# Patient Record
Sex: FEMALE | Race: ASIAN | URBAN - METROPOLITAN AREA
[De-identification: names, ages, dates, MRNs, and addresses within clinical notes are randomized per-mention and may not be internally consistent; named-entity substitution may affect disease eponyms.]

---

## 2014-07-02 LAB
ALT SERPL-CCNC: 19 U/L (ref 5–52)
AST SERPL-CCNC: 21 U/L (ref 5–39)
CHOLEST SERPL-MCNC: 170 MG/DL
CREAT SERPL-MCNC: 0.79 MG/DL (ref 0.4–1.4)
GFR SERPL CREATININE-BSD FRML MDRD: 85 ML/MIN/1.73M2
GLUCOSE SERPL-MCNC: 77 MG/DL (ref 65–125)
HDLC SERPL-MCNC: 91 MG/DL
LDLC SERPL CALC-MCNC: 72 MG/DL
POTASSIUM SERPL-SCNC: 5 MMOL/L (ref 3.5–5.3)
TRIGL SERPL-MCNC: 35 MG/DL
TSH SERPL-ACNC: 0.96 UIU/ML (ref 0.27–4.2)

## 2015-07-29 LAB
ALT SERPL-CCNC: 10 U/L (ref 5–52)
AST SERPL-CCNC: 21 U/L (ref 5–39)
CHOLEST SERPL-MCNC: 160 MG/DL
CREAT SERPL-MCNC: 0.62 MG/DL (ref 0.4–1.4)
GFR SERPL CREATININE-BSD FRML MDRD: 112 ML/MIN/1.73M2
GLUCOSE SERPL-MCNC: 82 MG/DL (ref 65–125)
HDLC SERPL-MCNC: 80 MG/DL
LDLC SERPL CALC-MCNC: 73 MG/DL
POTASSIUM SERPL-SCNC: 4.8 MEQ/L (ref 3.5–5.3)
TRIGL SERPL-MCNC: 33 MG/DL
TSH SERPL-ACNC: 1.42 UIU/ML (ref 0.27–4.2)

## 2016-06-20 LAB
ALT SERPL-CCNC: 14 U/L (ref 8–47)
AST SERPL-CCNC: 19 U/L (ref 5–38)
CHOLEST SERPL-MCNC: 191 MG/DL
CREAT SERPL-MCNC: 0.55 MG/DL (ref 0.4–1.4)
GFR SERPL CREATININE-BSD FRML MDRD: 124 ML/MIN/1.73M2
GLUCOSE SERPL-MCNC: 72 MG/DL (ref 65–125)
HBA1C MFR BLD: 5.4 % (ref 0–5.7)
HDLC SERPL-MCNC: 91 MG/DL
HEP C HIM: NORMAL
LDLC SERPL CALC-MCNC: 94 MG/DL
POTASSIUM SERPL-SCNC: 3.8 MEQ/L (ref 3.5–5.3)
TRIGL SERPL-MCNC: 32 MG/DL
TSH SERPL-ACNC: 1.21 UIU/ML (ref 0.27–4.2)

## 2016-10-20 LAB
ALT SERPL-CCNC: 15 U/L
AST SERPL-CCNC: 19 U/L
CREAT SERPL-MCNC: 0.54 MG/DL (ref 0.4–1.4)
GFR SERPL CREATININE-BSD FRML MDRD: 125 ML/MIN/1.73M2
GLUCOSE SERPL-MCNC: 78 MG/DL (ref 70–100)
POTASSIUM SERPL-SCNC: 4.4 MMOL/L (ref 3.5–5.3)

## 2017-04-11 ENCOUNTER — TRANSFERRED RECORDS (OUTPATIENT)
Dept: HEALTH INFORMATION MANAGEMENT | Facility: CLINIC | Age: 32
End: 2017-04-11

## 2017-04-13 ENCOUNTER — TRANSFERRED RECORDS (OUTPATIENT)
Dept: HEALTH INFORMATION MANAGEMENT | Facility: CLINIC | Age: 32
End: 2017-04-13

## 2017-06-20 LAB
AST SERPL-CCNC: 19 U/L
CHOLEST SERPL-MCNC: 198 MG/DL (ref 100–199)
GFR SERPL CREATININE-BSD FRML MDRD: 118 ML/MIN/1.73M2
GLUCOSE SERPL-MCNC: 80 MG/DL (ref 65–125)
HBA1C MFR BLD: 5.5 % (ref 0–5.7)
POTASSIUM SERPL-SCNC: 4.1 MMOL/L (ref 3.5–5.4)
TRIGL SERPL-MCNC: 41 MG/DL (ref 20–149)

## 2017-07-29 ENCOUNTER — TRANSFERRED RECORDS (OUTPATIENT)
Dept: HEALTH INFORMATION MANAGEMENT | Facility: CLINIC | Age: 32
End: 2017-07-29

## 2017-07-29 LAB — EJECTION FRACTION: 65

## 2017-11-10 LAB
ALT SERPL-CCNC: 13 U/L
AST SERPL-CCNC: 16 U/L
CREAT SERPL-MCNC: 0.61 MG/DL (ref 0.4–1.4)
GFR SERPL CREATININE-BSD FRML MDRD: 110 ML/MIN/1.73M2
GLUCOSE SERPL-MCNC: 81 MG/DL (ref 65–125)
POTASSIUM SERPL-SCNC: 4.7 MMOL/L (ref 3.5–5.4)

## 2018-04-03 ENCOUNTER — TRANSFERRED RECORDS (OUTPATIENT)
Dept: HEALTH INFORMATION MANAGEMENT | Facility: CLINIC | Age: 33
End: 2018-04-03

## 2018-04-03 LAB
ALT SERPL-CCNC: 10 U/L
AST SERPL-CCNC: 19 U/L
CHOLEST SERPL-MCNC: 184 MG/DL (ref 100–199)
CREAT SERPL-MCNC: 0.63 MG/DL (ref 0.4–1.4)
GFR SERPL CREATININE-BSD FRML MDRD: 118 ML/MIN/1.73M2
GLUCOSE SERPL-MCNC: 84 MG/DL (ref 65–125)
HBA1C MFR BLD: 5.2 % (ref 0–5.7)
HDLC SERPL-MCNC: 99 MG/DL
LDLC SERPL CALC-MCNC: 77 MG/DL
POTASSIUM SERPL-SCNC: 4.7 MMOL/L (ref 3.5–5.4)
TRIGL SERPL-MCNC: 39 MG/DL (ref 20–149)
TSH SERPL-ACNC: 1.34 UIU/ML (ref 0.27–4.2)

## 2018-06-07 ENCOUNTER — NURSE TRIAGE (OUTPATIENT)
Dept: NURSING | Facility: CLINIC | Age: 33
End: 2018-06-07

## 2018-06-26 ENCOUNTER — OFFICE VISIT (OUTPATIENT)
Dept: FAMILY MEDICINE | Facility: CLINIC | Age: 33
End: 2018-06-26
Payer: COMMERCIAL

## 2018-06-26 VITALS
DIASTOLIC BLOOD PRESSURE: 59 MMHG | HEIGHT: 57 IN | HEART RATE: 63 BPM | RESPIRATION RATE: 16 BRPM | SYSTOLIC BLOOD PRESSURE: 94 MMHG | BODY MASS INDEX: 18.2 KG/M2 | OXYGEN SATURATION: 99 % | WEIGHT: 84.38 LBS | TEMPERATURE: 97.9 F

## 2018-06-26 DIAGNOSIS — G47.00 INSOMNIA, UNSPECIFIED TYPE: Primary | ICD-10-CM

## 2018-06-26 PROBLEM — I34.1 MVP (MITRAL VALVE PROLAPSE): Status: ACTIVE | Noted: 2018-06-26

## 2018-06-26 PROBLEM — K52.832 LYMPHOCYTIC COLITIS: Status: ACTIVE | Noted: 2018-06-26

## 2018-06-26 PROBLEM — R00.2 PALPITATIONS: Status: ACTIVE | Noted: 2018-06-26

## 2018-06-26 PROCEDURE — 99203 OFFICE O/P NEW LOW 30 MIN: CPT | Performed by: NURSE PRACTITIONER

## 2018-06-26 RX ORDER — CLONAZEPAM 1 MG/1
TABLET ORAL
Qty: 30 TABLET | Refills: 5 | Status: SHIPPED | OUTPATIENT
Start: 2018-06-26 | End: 2018-09-25

## 2018-06-26 RX ORDER — CLONAZEPAM 1 MG/1
TABLET ORAL
COMMUNITY
Start: 2018-04-03 | End: 2018-06-26

## 2018-06-26 NOTE — PROGRESS NOTES
"  SUBJECTIVE:   Selina Pappas is a 33 year old female who presents to clinic today for the following health issues:    Was seeing Dr. Livier Wade   [facility] [address] [place] [address]    Takes Klonopin for sleep, is out of Rx  When she lived in [place] she was taking 1-3 times a week before work shifts. Now taking less than that.     She has a history of initial insomnia for many years.  She has been seen at a sleep clinic, tried sleep therapy.  Takes Klonopin on nights before she has to work.  Can sometimes go a couple of weeks without needing to take it, otherwise up to 3 times a week.            Problem list and histories reviewed & adjusted, as indicated.  Additional history: as documented        Reviewed and updated as needed this visit by clinical staff  Meds       Reviewed and updated as needed this visit by Provider         ROS:  CONSTITUTIONAL: NEGATIVE for fever, chills, change in weight  ENT/MOUTH: NEGATIVE for ear, mouth and throat problems  RESP: NEGATIVE for significant cough or SOB  CV: NEGATIVE for chest pain, palpitations or peripheral edema  GI: NEGATIVE for nausea, abdominal pain, heartburn, or change in bowel habits  MUSCULOSKELETAL: NEGATIVE for significant arthralgias or myalgia  NEURO: NEGATIVE for weakness, dizziness or paresthesias  PSYCHIATRIC: NEGATIVE for changes in mood or affect    OBJECTIVE:     BP 94/59  Pulse 63  Temp 97.9  F (36.6  C) (Oral)  Resp 16  Ht 4' 9\" (1.448 m)  Wt 84 lb 6 oz (38.3 kg)  LMP 06/01/2018 (Approximate)  SpO2 99%  Breastfeeding? No  BMI 18.26 kg/m2  Body mass index is 18.26 kg/(m^2).  GENERAL: healthy, alert and no distress  RESP: lungs clear to auscultation - no rales, rhonchi or wheezes  CV: regular rates and rhythm, normal S1 S2, no S3 or S4    PSYCH: mentation appears normal, affect normal/bright        ASSESSMENT/PLAN:             1. Insomnia, unspecified type  Appropriate use of Klonopin.  Refills given.    Consider " trial of Trazodone in future if desired.  She will follow up for an annual exam.   - clonazePAM (KLONOPIN) 1 MG tablet; Take one tablet nightly as needed for insomnia  Dispense: 30 tablet; Refill: 5        Selina Armando NP  [facility]

## 2018-06-26 NOTE — MR AVS SNAPSHOT
"              After Visit Summary   6/26/2018    Selina Pappas    MRN: [id number]           Patient Information     Date Of Birth          1985        Visit Information        Provider Department      6/26/2018 3:30 PM Selina Armando NP [facility]        Today's Diagnoses     Insomnia, unspecified type    -  1       Follow-ups after your visit        Who to contact     If you have questions or need follow up information about today's clinic visit or your schedule please contact [facility] directly at [phone].  Normal or non-critical lab and imaging results will be communicated to you by [facility], letter or phone within 4 business days after the clinic has received the results. If you do not hear from us within 7 days, please contact the clinic through [employer] or phone. If you have a critical or abnormal lab result, we will notify you by phone as soon as possible.  Submit refill requests through [employer] or call your pharmacy and they will forward the refill request to us. Please allow 3 business days for your refill to be completed.          Additional Information About Your Visit        MyChart Information     [employer] gives you secure access to your electronic health record. If you see a primary care provider, you can also send messages to your care team and make appointments. If you have questions, please call your primary care clinic.  If you do not have a primary care provider, please call [phone] and they will assist you.        Care EveryWhere ID     This is your Care EveryWhere ID. This could be used by other organizations to access your [facility] medical records  [id number]        Your Vitals Were     Pulse Temperature Respirations Height Last Period Pulse Oximetry    63 97.9  F (36.6  C) (Oral) 16 4' 9\" (1.448 m) 06/01/2018 (Approximate) 99%    Breastfeeding? BMI (Body Mass Index)                No 18.26 kg/m2           Blood Pressure from Last 3 " Encounters:   06/26/18 94/59    Weight from Last 3 Encounters:   06/26/18 84 lb 6 oz (38.3 kg)              Today, you had the following     No orders found for display         Today's Medication Changes          These changes are accurate as of 6/26/18 10:18 PM.  If you have any questions, ask your nurse or doctor.               These medicines have changed or have updated prescriptions.        Dose/Directions    clonazePAM 1 MG tablet   Commonly known as:  klonoPIN   This may have changed:    - when to take this  - reasons to take this  - additional instructions   Used for:  Insomnia, unspecified type   Changed by:  Selina Armando, NP        Take one tablet nightly as needed for insomnia   Quantity:  30 tablet   Refills:  5            Where to get your medicines      Some of these will need a paper prescription and others can be bought over the counter.  Ask your nurse if you have questions.     Bring a paper prescription for each of these medications     clonazePAM 1 MG tablet                Primary Care Provider Fax #    Physician No Ref-Primary [phone]       No address on file        Equal Access to Services     [facility] : Lindsey Whitehead, waaxda lujaquelin, qaybta kaalmada adeaugusto, mike nguyen . So [facility] [phone].    ATENCIÓN: Si habla español, tiene a rodrigues disposición servicios gratuitos de asistencia lingüística. Llame al [phone].    We comply with applicable federal civil rights laws and [place] laws. We do not discriminate on the basis of race, color, national origin, age, disability, sex, sexual orientation, or gender identity.            Thank you!     Thank you for choosing [facility]  for your care. Our goal is always to provide you with excellent care. Hearing back from our patients is one way we can continue to improve our services. Please take a few minutes to complete the written survey that you may receive in the  mail after your visit with us. Thank you!             Your Updated Medication List - Protect others around you: Learn how to safely use, store and throw away your medicines at www.disposemymeds.org.          This list is accurate as of 6/26/18 10:18 PM.  Always use your most recent med list.                   Brand Name Dispense Instructions for use Diagnosis    clonazePAM 1 MG tablet    klonoPIN    30 tablet    Take one tablet nightly as needed for insomnia    Insomnia, unspecified type

## 2018-06-27 ENCOUNTER — HEALTH MAINTENANCE LETTER (OUTPATIENT)
Age: 33
End: 2018-06-27

## 2018-06-29 ENCOUNTER — DOCUMENTATION ONLY (OUTPATIENT)
Dept: FAMILY MEDICINE | Facility: CLINIC | Age: 33
End: 2018-06-29

## 2018-07-16 LAB — INR PPP: 3.64 (ref 0.9–1.1)

## 2018-08-21 ENCOUNTER — OFFICE VISIT (OUTPATIENT)
Dept: OBGYN | Facility: CLINIC | Age: 33
End: 2018-08-21
Attending: NURSE PRACTITIONER
Payer: COMMERCIAL

## 2018-08-21 VITALS
SYSTOLIC BLOOD PRESSURE: 100 MMHG | DIASTOLIC BLOOD PRESSURE: 64 MMHG | BODY MASS INDEX: 18.83 KG/M2 | HEART RATE: 60 BPM | WEIGHT: 87 LBS

## 2018-08-21 DIAGNOSIS — R10.2 LEFT ADNEXAL TENDERNESS: ICD-10-CM

## 2018-08-21 DIAGNOSIS — R10.2 PELVIC PAIN IN FEMALE: ICD-10-CM

## 2018-08-21 DIAGNOSIS — Z12.4 SCREENING FOR MALIGNANT NEOPLASM OF CERVIX: ICD-10-CM

## 2018-08-21 DIAGNOSIS — Z11.3 SCREENING EXAMINATION FOR VENEREAL DISEASE: ICD-10-CM

## 2018-08-21 DIAGNOSIS — Z00.00 VISIT FOR PREVENTIVE HEALTH EXAMINATION: Primary | ICD-10-CM

## 2018-08-21 DIAGNOSIS — Z01.419 ENCOUNTER FOR GYNECOLOGICAL EXAMINATION WITHOUT ABNORMAL FINDING: ICD-10-CM

## 2018-08-21 PROCEDURE — 87591 N.GONORRHOEAE DNA AMP PROB: CPT | Performed by: NURSE PRACTITIONER

## 2018-08-21 PROCEDURE — G0463 HOSPITAL OUTPT CLINIC VISIT: HCPCS | Mod: ZF

## 2018-08-21 PROCEDURE — 87491 CHLMYD TRACH DNA AMP PROBE: CPT | Performed by: NURSE PRACTITIONER

## 2018-08-21 PROCEDURE — G0145 SCR C/V CYTO,THINLAYER,RESCR: HCPCS | Performed by: NURSE PRACTITIONER

## 2018-08-21 PROCEDURE — 87624 HPV HI-RISK TYP POOLED RSLT: CPT | Performed by: NURSE PRACTITIONER

## 2018-08-21 PROCEDURE — 87210 SMEAR WET MOUNT SALINE/INK: CPT | Mod: ZF | Performed by: NURSE PRACTITIONER

## 2018-08-21 ASSESSMENT — PAIN SCALES - GENERAL: PAINLEVEL: NO PAIN (0)

## 2018-08-21 NOTE — LETTER
Date:August 31, 2018      Patient was self referred, no letter generated. Do not send.        [facility] Health Information

## 2018-08-21 NOTE — PATIENT INSTRUCTIONS
Pap smear and STD testing were completed today.  Schedule pelvic ultrasound at the  before you leave.  There were no signs of a vaginal infection today.   Start taking a prenatal vitamin daily     You will be called with results of Ultrasound and further plan for care determined at that time.       PREVENTIVE HEALTH RECOMMENDATIONS:   Most women need a yearly breast and pelvic exam.    A PAP screen, a test done DURING a pelvic exam, is NO longer recommended yearly.    March 2013, screening guidelines recommended by ACOG for PAP screen are:    1) First pap at age 21.    2) Pap every 3 years until age 30.    3) After age 30, pap every 3 years or Pap with HR HPV screen every 5 years until age 65.  4) Women do NOT need a vaginal Pap screen after a hysterectomy (surgical removal of the uterus) when they have not had cancer.    Exceptions:  1) Yearly pap if HIV+ or immunosuppressed secondary to organ transplant  2) MARIE II-III continue routine screening for 20 years.    I encourage you continue looking for opportunities to choose a healthy lifestyle:       * Choose to eat a heart healthy diet. Check out the FOOD PLATE guidelines at: http://www.choosemyplate.gov/ for helpful hints on weight and cholesterol management.  Balance your caloric intake with exercise to maintain a BMI in the 22 to 26 range. For bone health: Eat calcium-rich foods like yogurt, broccoli or take chewable calcium pills (500 to 600 mg) twice a day with food.       * Exercise for at least an average of 30 minutes a day, 5 days of the week. This will help you control your weight, release stress, and help prevent disease.      * Take a Vitamin D3 supplement daily fall through spring and during summer unless you [id number]' full body sun exposure to skin without sunscreen.      * DO wear sunscreen to prevent skin cancer after the first 15-30 minutes.      * Identify stressors in your life, find ways to release the stress, and, make time for  yourself. PLEASE ask for help if mood changes last longer than two weeks.     * Limit alcohol to one drink per day.  No smoking.  Avoid second hand smoke. If you smoke, ask for help to stop.       *  If you are in a sexual relationship, talk with your partner about possible infection risks and take action to protect yourself from exposure to a sexual infection.    Please request an infection screen for STIs (sexually transmitted infections) if you are less than age 26 OR believe that you may be at risk.     Get a flu shot each year. Get a tetanus shot every 10 years. EVERYONE needs a pertussis (Whooping cough) booster.    See your dentist twice a year for an exam and preventive care cleaning.     Consider the following screen tests:    1) cholesterol test every 5 years.     2) yearly mammogram after age 40 unless you have identified risks.    3) colonoscopy every 10 years after age 50 unless you have identified risks.    4) diabetes blood test screening if you are at risk for diabetes.      Additional information that you may also find helpful:  The Internet now gives us access to LOTS of information -- some of it helpful, research documented and also plenty of harmful, anecdotal information that may not pertain to your situtaion. Consider visiting the following websites for accurate health information:    www.vitamindcouncil.org/ : Info and ongoing research re Vitamin D    www.fairview.org : Up to date and easily searchable information on multiple topics.    www.medlineplus.gov : medication info, interactive tutorials, watch real surgeries online    www.cdc.gov : public health info, travel advisories, epidemics (H1N1)    www.mendel/std.org: current research re diagnosis, treatment and prevention of sexually contacted infections.    www.health.[facility].[place] : [place] dept of heatlh, public health issues in [place], N1N1    www.familydoctor.org : good info from the Academy of Family Physicians

## 2018-08-21 NOTE — MR AVS SNAPSHOT
After Visit Summary   8/21/2018    Selina Pappas    MRN: [id number]           Patient Information     Date Of Birth          1985        Visit Information        Provider Department      8/21/2018 2:30 PM Lila Law APRN [facility]        Today's Diagnoses     Left adnexal tenderness    -  1    Visit for preventive health examination        Screening examination for venereal disease        Screening for malignant neoplasm of cervix        Encounter for gynecological examination without abnormal finding        Pelvic pain in female          Care Instructions    Pap smear and STD testing were completed today.  Schedule pelvic ultrasound at the  before you leave.  There were no signs of a vaginal infection today.     You will be called with results of Ultrasound and further plan for care determined at that time.       PREVENTIVE HEALTH RECOMMENDATIONS:   Most women need a yearly breast and pelvic exam.    A PAP screen, a test done DURING a pelvic exam, is NO longer recommended yearly.    March 2013, screening guidelines recommended by ACOG for PAP screen are:    1) First pap at age 21.    2) Pap every 3 years until age 30.    3) After age 30, pap every 3 years or Pap with HR HPV screen every 5 years until age 65.  4) Women do NOT need a vaginal Pap screen after a hysterectomy (surgical removal of the uterus) when they have not had cancer.    Exceptions:  1) Yearly pap if HIV+ or immunosuppressed secondary to organ transplant  2) MARIE II-III continue routine screening for 20 years.    I encourage you continue looking for opportunities to choose a healthy lifestyle:       * Choose to eat a heart healthy diet. Check out the FOOD PLATE guidelines at: http://www.choosemyplate.gov/ for helpful hints on weight and cholesterol management.  Balance your caloric intake with exercise to maintain a BMI in the 22 to 26 range. For bone health: Eat calcium-rich  foods like yogurt, broccoli or take chewable calcium pills (500 to 600 mg) twice a day with food.       * Exercise for at least an average of 30 minutes a day, 5 days of the week. This will help you control your weight, release stress, and help prevent disease.      * Take a Vitamin D3 supplement daily fall through spring and during summer unless you [id number]' full body sun exposure to skin without sunscreen.      * DO wear sunscreen to prevent skin cancer after the first 15-30 minutes.      * Identify stressors in your life, find ways to release the stress, and, make time for yourself. PLEASE ask for help if mood changes last longer than two weeks.     * Limit alcohol to one drink per day.  No smoking.  Avoid second hand smoke. If you smoke, ask for help to stop.       *  If you are in a sexual relationship, talk with your partner about possible infection risks and take action to protect yourself from exposure to a sexual infection.    Please request an infection screen for STIs (sexually transmitted infections) if you are less than age 26 OR believe that you may be at risk.     Get a flu shot each year. Get a tetanus shot every 10 years. EVERYONE needs a pertussis (Whooping cough) booster.    See your dentist twice a year for an exam and preventive care cleaning.     Consider the following screen tests:    1) cholesterol test every 5 years.     2) yearly mammogram after age 40 unless you have identified risks.    3) colonoscopy every 10 years after age 50 unless you have identified risks.    4) diabetes blood test screening if you are at risk for diabetes.      Additional information that you may also find helpful:  The Internet now gives us access to LOTS of information -- some of it helpful, research documented and also plenty of harmful, anecdotal information that may not pertain to your situtaion. Consider visiting the following websites for accurate health information:    www.vitamindcouncil.org/ : Info and  ongoing research re Vitamin D    www.fairview.org : Up to date and easily searchable information on multiple topics.    www.medlineplus.gov : medication info, interactive tutorials, watch real surgeries online    www.cdc.gov : public health info, travel advisories, epidemics (H1N1)    www.mendel/std.org: current research re diagnosis, treatment and prevention of sexually contacted infections.    www.health.[facility].[place] : [place] dept [facility], public health issues in [place], N1N1    www.familydoctor.org : good info from the Academy of Family Physicians                Follow-ups after your visit        Follow-up notes from your care team     Return in 1 year (on 8/21/2019) for Preventative Health Visit.      Future tests that were ordered for you today     Open Future Orders        Priority Expected Expires Ordered    US Pelvic Complete with Transvaginal Routine  8/21/2019 8/21/2018            Who to contact     Please call your clinic at [phone] to:    Ask questions about your health    Make or cancel appointments    Discuss your medicines    Learn about your test results    Speak to your doctor            Additional Information About Your Visit        [employer] Information     [employer] gives you secure access to your electronic health record. If you see a primary care provider, you can also send messages to your care team and make appointments. If you have questions, please call your primary care clinic.  If you do not have a primary care provider, please call [phone] and they will assist you.      [employer] is an electronic gateway that provides easy, online access to your medical records. With [employer], you can request a clinic appointment, read your test results, renew a prescription or communicate with your care team.     To access your existing account, please contact your [facility] or call [phone] for assistance.        Care EveryWhere ID     This is your Care EveryWhere ID. This  could be used by other organizations to access your [facility] medical records  [id number]        Your Vitals Were     Pulse Last Period Breastfeeding? BMI (Body Mass Index)          60 07/07/2018 No 18.83 kg/m2         Blood Pressure from Last 3 Encounters:   08/21/18 100/64   06/26/18 94/59    Weight from Last 3 Encounters:   08/21/18 39.5 kg (87 lb)   06/26/18 38.3 kg (84 lb 6 oz)              We Performed the Following     Chlamydia trachomatis PCR Swab      HPV High Risk Types DNA Cervical     Neisseria gonorrhoeae PCR Swab [[id number]]     Pap imaged thin layer screen with HPV - recommended age 30 - 65 years (select HPV order below)     Pap Smear Exam [] Do Not Remove     Pelvic and Breast Exam Procedure []     Wet Prep POCT        Primary Care Provider Fax #    Physician No Ref-Primary [phone]       No address on file        Equal Access to Services     [facility] : Lindsey Whitehead, americo millan, aniya pattersonalmajoel redmond, mike nguyen . So [facility] [place] [phone].    ATENCIÓN: Si habla español, tiene a rodrigues disposición servicios gratuitos de asistencia lingüística. Llame al [phone].    We comply with applicable federal civil rights laws and [place] laws. We do not discriminate on the basis of race, color, national origin, age, disability, sex, sexual orientation, or gender identity.            Thank you!     Thank you for choosing [facility]  for your care. Our goal is always to provide you with excellent care. Hearing back from our patients is one way we can continue to improve our services. Please take a few minutes to complete the written survey that you may receive in the mail after your visit with us. Thank you!             Your Updated Medication List - Protect others around you: Learn how to safely use, store and throw away your medicines at www.disposemymeds.org.          This list is accurate as of 8/21/18  3:27 PM.   Always use your most recent med list.                   Brand Name Dispense Instructions for use Diagnosis    clonazePAM 1 MG tablet    klonoPIN    30 tablet    Take one tablet nightly as needed for insomnia    Insomnia, unspecified type

## 2018-08-21 NOTE — LETTER
"2018       RE: Selina Pappas  [address]  [facility] [address]     Dear Colleague,    Thank you for referring your patient, Selina Pappas, to the [facility] at [facility]. Please see a copy of my visit note below.      Progress Note    SUBJECTIVE:  Selina Pappas is an 33 year old, , who requests an Annual Preventive Exam.   PCP: Selina keane CNP    Concerns today include:     1. Right sided sharp pelvic/ lower quadrant abdominal pain: Started about 1 month ago. Notes it \"randomly\" throughout the days. Does report mild bloating.  Selina has a hx of lymphocytic colitis after a colonoscopy to evaluate bloating and diarrhea in 2017. She recently saw gastroenterology who does not think the pain is GI related.  Her pain does not seem to be correlated with meals. Denies fever, nausea, urinary symptoms, constipation, or diarrhea.  She has been with the same sexual partner for 5 yrs. Denies concern for STD, but open to testing today. Denies change in vaginal discharge, malodor, or vaginal itching.     Contraception: pull out method; considering trying to conceive in the coming months.     LMP: 2018  - Menses: q34 days with 2 days of moderate and then 2 days of light bleeding  - Does have significant dysmenorrhea; does not typically take any medication to relieve this. Not able to take NSAIDS due to her history of colitis.     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    *Did complete HPV vaccines at age 26-27.    Mammogram current: n/a - paternal grandmother as only family meber with breast cancer    HISTORY:    Current Outpatient Prescriptions on File Prior to Visit:  clonazePAM (KLONOPIN) 1 MG tablet Take one tablet nightly as needed for insomnia     No current facility-administered " medications on file prior to visit.   No Known Allergies  Immunization History   Administered Date(s) Administered     TDAP Vaccine (Adacel) 2017       Obstetric History       T0      L0     SAB1   TAB0   Ectopic0   Multiple0   Live Births0      Past Medical History:   Diagnosis Date     Abnormal Pap smear of cervix 2005     Lymphocytic colitis      Mitral valve prolapse      Past Surgical History:   Procedure Laterality Date     LEEP [place], CERVICAL  2004     Family History   Problem Relation Age of Onset     Hyperlipidemia Mother      Hyperlipidemia Father      Hypertension Father      Osteoporosis Maternal Grandmother      Lung Cancer Maternal Grandfather      Breast Cancer Paternal Grandmother      Alcoholism Paternal Grandfather      Social History     Social History     Marital status:      Spouse name: N/A     Number of children: N/A     Years of education: N/A     Social History Main Topics     Smoking status: Never Smoker     Smokeless tobacco: Never Used     Alcohol use Yes      Comment: maybe 2-4 times per month      Drug use: No     Sexual activity: Yes     Partners: Male     Birth control/ protection: None, Pull-out method     ROS  ROS: 10 point ROS neg other than the symptoms noted above in the HPI.    No flowsheet data found.  No flowsheet data found.      EXAM:  Blood pressure 100/64, pulse 60, weight 39.5 kg (87 lb), last menstrual period 2018, not currently breastfeeding. Body mass index is 18.83 kg/(m^2).  General - pleasant female in no acute distress.  Skin - no suspicious lesions or rashes  EENT-  PERRLA, euthyroid with out palpable nodules  Neck - supple without lymphadenopathy.  Lungs - clear to auscultation bilaterally.  Heart - regular rate and rhythm without murmur.  Abdomen - soft, nontender, nondistended, no masses or organomegaly noted.  Musculoskeletal - no gross deformities.  Neurological - normal strength, sensation, and mental status.    Breast  Exam:  Breast: Without visible skin changes. No dimpling or lesions seen.   Breasts supple, non-tender with palpation, no dominant mass, nodularity, or nipple discharge noted bilaterally. Axillary nodes negative.      Pelvic Exam:  EG/BUS: Normal genital architecture without lesions, erythema or abnormal secretions; Bartholin's, Urethra, [place]'s normal   Urethral meatus: normal   Urethra: no masses, tenderness, or scarring   Bladder: no masses or tenderness   Vagina: moist, pink, rugae with creamy, white and odorless secretions  Cervix: pink, moist, closed, without lesion or CMT  Uterus: small, smooth, firm, mobile w/o pain  Adnexa: Within normal limits and No masses, nodularity, mild right adnexal tenderness  Rectum: anus normal     Wet prep: pH=3.6; negative for clue cells, yeast, and trich  UPT: negative    ASSESSMENT:  Encounter Diagnoses   Name Primary?     Visit for preventive health examination Yes     Screening examination for venereal disease      Screening for malignant neoplasm of cervix      Encounter for gynecological examination without abnormal finding      Left adnexal tenderness      Pelvic pain in female         PLAN:   Orders Placed This Encounter   Procedures     Pelvic and Breast Exam Procedure []     Pap Smear Exam [] Do Not Remove     US Pelvic Complete with Transvaginal     Pap imaged thin layer screen with HPV - recommended age 30 - 65 years (select HPV order below)     HPV High Risk Types DNA Cervical     Wet Prep POCT     hCG qual urine POCT     Pap with HPV testing done today.  Gonorrhea & chlamydia tests done today  No signs of a vaginal infection.   Patient to schedule pelvic ultrasound to evaluate for cause of right adnexal pain. Patient will be called with the results of the ultrasound and further plan for care will be determined at that time.  May manage pelvic pain with application of heat and Tylenol as needed.  Recommend starting a prenatal vitamin daily.    Additional  teaching done at this visit regarding calcium (1200 mg per day), self breast exam, exercise, preconception, mental health and weight/diet.  Return to clinic in one year for next preventative health exam.  Follow-up as needed.    Lila Law DNP, APRN, WHNP                                  Again, thank you for allowing me to participate in the care of your patient.      Sincerely,    ANTONIO Hutchins CNP

## 2018-08-21 NOTE — PROGRESS NOTES
"  Progress Note    SUBJECTIVE:  Selina Pappas is an 33 year old, , who requests an Annual Preventive Exam.   PCP: Selina keane CNP    Concerns today include:     1. Right sided sharp pelvic/ lower quadrant abdominal pain: Started about 1 month ago. Notes it \"randomly\" throughout the days. Does report mild bloating.  Selina has a hx of lymphocytic colitis after a colonoscopy to evaluate bloating and diarrhea in 2017. She recently saw gastroenterology who does not think the pain is GI related.  Her pain does not seem to be correlated with meals. Denies fever, nausea, urinary symptoms, constipation, or diarrhea.  She has been with the same sexual partner for 5 yrs. Denies concern for STD, but open to testing today. Denies change in vaginal discharge, malodor, or vaginal itching.     Contraception: pull out method; considering trying to conceive in the coming months.     LMP: 2018  - Menses: q34 days with 2 days of moderate and then 2 days of light bleeding  - Does have significant dysmenorrhea; does not typically take any medication to relieve this. Not able to take NSAIDS due to her history of colitis.     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    *Did complete HPV vaccines at age 26-27.    Mammogram current: n/a - paternal grandmother as only family meber with breast cancer    HISTORY:    Current Outpatient Prescriptions on File Prior to Visit:  clonazePAM (KLONOPIN) 1 MG tablet Take one tablet nightly as needed for insomnia     No current facility-administered medications on file prior to visit.   No Known Allergies  Immunization History   Administered Date(s) Administered     TDAP Vaccine (Adacel) 2017       Obstetric History       T0      L0     SAB1   TAB0   Ectopic0   Multiple0   Live Births0      Past Medical History:   Diagnosis Date "     Abnormal Pap smear of cervix 2005     Lymphocytic colitis      Mitral valve prolapse      Past Surgical History:   Procedure Laterality Date     LEEP [place], CERVICAL  2004     Family History   Problem Relation Age of Onset     Hyperlipidemia Mother      Hyperlipidemia Father      Hypertension Father      Osteoporosis Maternal Grandmother      Lung Cancer Maternal Grandfather      Breast Cancer Paternal Grandmother      Alcoholism Paternal Grandfather      Social History     Social History     Marital status:      Spouse name: N/A     Number of children: N/A     Years of education: N/A     Social History Main Topics     Smoking status: Never Smoker     Smokeless tobacco: Never Used     Alcohol use Yes      Comment: maybe 2-4 times per month      Drug use: No     Sexual activity: Yes     Partners: Male     Birth control/ protection: None, Pull-out method     ROS  ROS: 10 point ROS neg other than the symptoms noted above in the HPI.    No flowsheet data found.  No flowsheet data found.      EXAM:  Blood pressure 100/64, pulse 60, weight 39.5 kg (87 lb), last menstrual period 07/07/2018, not currently breastfeeding. Body mass index is 18.83 kg/(m^2).  General - pleasant female in no acute distress.  Skin - no suspicious lesions or rashes  EENT-  PERRLA, euthyroid with out palpable nodules  Neck - supple without lymphadenopathy.  Lungs - clear to auscultation bilaterally.  Heart - regular rate and rhythm without murmur.  Abdomen - soft, nontender, nondistended, no masses or organomegaly noted.  Musculoskeletal - no gross deformities.  Neurological - normal strength, sensation, and mental status.    Breast Exam:  Breast: Without visible skin changes. No dimpling or lesions seen.   Breasts supple, non-tender with palpation, no dominant mass, nodularity, or nipple discharge noted bilaterally. Axillary nodes negative.      Pelvic Exam:  EG/BUS: Normal genital architecture without lesions, erythema or abnormal  secretions; Bartholin's, Urethra, [place]'s normal   Urethral meatus: normal   Urethra: no masses, tenderness, or scarring   Bladder: no masses or tenderness   Vagina: moist, pink, rugae with creamy, white and odorless secretions  Cervix: pink, moist, closed, without lesion or CMT  Uterus: small, smooth, firm, mobile w/o pain  Adnexa: Within normal limits and No masses, nodularity, mild right adnexal tenderness  Rectum: anus normal     Wet prep: pH=3.6; negative for clue cells, yeast, and trich  UPT: negative    ASSESSMENT:  Encounter Diagnoses   Name Primary?     Visit for preventive health examination Yes     Screening examination for venereal disease      Screening for malignant neoplasm of cervix      Encounter for gynecological examination without abnormal finding      Left adnexal tenderness      Pelvic pain in female         PLAN:   Orders Placed This Encounter   Procedures     Pelvic and Breast Exam Procedure []     Pap Smear Exam [] Do Not Remove     US Pelvic Complete with Transvaginal     Pap imaged thin layer screen with HPV - recommended age 30 - 65 years (select HPV order below)     HPV High Risk Types DNA Cervical     Wet Prep POCT     hCG qual urine POCT     Pap with HPV testing done today.  Gonorrhea & chlamydia tests done today  No signs of a vaginal infection.   Patient to schedule pelvic ultrasound to evaluate for cause of right adnexal pain. Patient will be called with the results of the ultrasound and further plan for care will be determined at that time.  May manage pelvic pain with application of heat and Tylenol as needed.  Recommend starting a prenatal vitamin daily.    Additional teaching done at this visit regarding calcium (1200 mg per day), self breast exam, exercise, preconception, mental health and weight/diet.  Return to clinic in one year for next preventative health exam.  Follow-up as needed.    Lila Law, DNP, APRN, WHNP

## 2018-08-22 LAB
C TRACH DNA SPEC QL NAA+PROBE: NEGATIVE
CLUE CELLS: NORMAL
N GONORRHOEA DNA SPEC QL NAA+PROBE: NEGATIVE
SPECIMEN SOURCE: NORMAL
SPECIMEN SOURCE: NORMAL
TRICHOMONAS (WET PREP): NORMAL
YEAST (WET PREP): NORMAL

## 2018-08-24 LAB
COPATH REPORT: NORMAL
PAP: NORMAL

## 2018-08-28 LAB
FINAL DIAGNOSIS: NORMAL
HPV HR 12 DNA CVX QL NAA+PROBE: NEGATIVE
HPV16 DNA SPEC QL NAA+PROBE: NEGATIVE
HPV18 DNA SPEC QL NAA+PROBE: NEGATIVE
SPECIMEN DESCRIPTION: NORMAL
SPECIMEN SOURCE CVX/VAG CYTO: NORMAL

## 2018-09-04 ENCOUNTER — RADIANT APPOINTMENT (OUTPATIENT)
Dept: ULTRASOUND IMAGING | Facility: CLINIC | Age: 33
End: 2018-09-04
Attending: PHYSICAL MEDICINE & REHABILITATION
Payer: COMMERCIAL

## 2018-09-04 DIAGNOSIS — R10.2 LEFT ADNEXAL TENDERNESS: ICD-10-CM

## 2018-09-04 DIAGNOSIS — R10.2 PELVIC PAIN IN FEMALE: ICD-10-CM

## 2018-09-04 PROCEDURE — 76856 US EXAM PELVIC COMPLETE: CPT

## 2018-09-22 ENCOUNTER — TELEPHONE (OUTPATIENT)
Dept: OBGYN | Facility: CLINIC | Age: 33
End: 2018-09-22

## 2018-09-22 NOTE — TELEPHONE ENCOUNTER
Received call from patient via on call paging service re: 7 weeks pregnant and unable to sleep.     Has tried lifestyle modifications, OTC benadryl, OTC unisom, melatonin, etc and reports unable to sleep more than 1-2 hours for the last several weeks. She has baseline problems sleeping and for the last several years in fact, has gotten at most 4 hours per night. Takes Klonopin prn for sleep, but has not been taking since pregnancy. She became tearful when describing her struggles with sleep deprivation over the last few years.     We discussed I am unable to call her in any controlled substances (ambien, etc) for sleep over the weekend. Would really recommend consultation in clinic to discuss risks/benefits of medications and also likely needs some alternative therapies. She has seen sleep specialists in the past. Also endorses has some anxiety currently and in the past, and also has past history of some depression/suicidal ideation. Denies current depressive thoughts, SI/HI or plans.     I highly recommend coming in to clinic early next week, preferable Monday if there are any openings. We discussed likely would be best to start serotonin specific reuptake inhibitor as these are best studied in pregnancy and may help with longer-term anxiety and sleep as well. Could possibly consider short-term ambien while awaiting effects of SSRIs, but would NOT be giving her any more than 5-10 tabs and would need to find alternatives and better studied therapies for the remainder of her pregnancy. She seemed amenable to seeing psychiatry as well when I mentioned this. Will try to get her worked into clinic early this coming week to address these issues, before her NOB visit scheduled in 2 weeks.     Ana Lilia Mayers MD  9/22/2018  10:08 AM

## 2018-09-25 ENCOUNTER — OFFICE VISIT (OUTPATIENT)
Dept: INTERNAL MEDICINE | Facility: CLINIC | Age: 33
End: 2018-09-25
Attending: INTERNAL MEDICINE
Payer: COMMERCIAL

## 2018-09-25 ENCOUNTER — RADIANT APPOINTMENT (OUTPATIENT)
Dept: ULTRASOUND IMAGING | Facility: CLINIC | Age: 33
End: 2018-09-25
Attending: INTERNAL MEDICINE
Payer: COMMERCIAL

## 2018-09-25 VITALS
DIASTOLIC BLOOD PRESSURE: 60 MMHG | HEIGHT: 57 IN | SYSTOLIC BLOOD PRESSURE: 94 MMHG | HEART RATE: 77 BPM | WEIGHT: 87.4 LBS | BODY MASS INDEX: 18.85 KG/M2

## 2018-09-25 DIAGNOSIS — Z33.1 PREGNANCY, INCIDENTAL: Primary | ICD-10-CM

## 2018-09-25 DIAGNOSIS — Z34.00 SUPERVISION OF NORMAL FIRST PREGNANCY, ANTEPARTUM: ICD-10-CM

## 2018-09-25 DIAGNOSIS — F51.04 PSYCHOPHYSIOLOGICAL INSOMNIA: Primary | ICD-10-CM

## 2018-09-25 DIAGNOSIS — Z33.1 PREGNANCY, INCIDENTAL: ICD-10-CM

## 2018-09-25 LAB
ABO + RH BLD: NORMAL
ABO + RH BLD: NORMAL
BASOPHILS # BLD AUTO: 0 10E9/L (ref 0–0.2)
BASOPHILS NFR BLD AUTO: 0.4 %
BLD GP AB SCN SERPL QL: NORMAL
BLOOD BANK CMNT PATIENT-IMP: NORMAL
DEPRECATED CALCIDIOL+CALCIFEROL SERPL-MC: 33 UG/L (ref 20–75)
DIFFERENTIAL METHOD BLD: NORMAL
EOSINOPHIL # BLD AUTO: 0 10E9/L (ref 0–0.7)
EOSINOPHIL NFR BLD AUTO: 0.6 %
ERYTHROCYTE [DISTWIDTH] IN BLOOD BY AUTOMATED COUNT: 12.8 % (ref 10–15)
HBV SURFACE AB SERPL IA-ACNC: 249.1 M[IU]/ML
HBV SURFACE AG SERPL QL IA: NONREACTIVE
HCT VFR BLD AUTO: 36.5 % (ref 35–47)
HGB BLD-MCNC: 12.4 G/DL (ref 11.7–15.7)
HIV 1+2 AB+HIV1 P24 AG SERPL QL IA: NONREACTIVE
IMM GRANULOCYTES # BLD: 0 10E9/L (ref 0–0.4)
IMM GRANULOCYTES NFR BLD: 0.2 %
LYMPHOCYTES # BLD AUTO: 1.5 10E9/L (ref 0.8–5.3)
LYMPHOCYTES NFR BLD AUTO: 27.1 %
MCH RBC QN AUTO: 32 PG (ref 26.5–33)
MCHC RBC AUTO-ENTMCNC: 34 G/DL (ref 31.5–36.5)
MCV RBC AUTO: 94 FL (ref 78–100)
MONOCYTES # BLD AUTO: 0.4 10E9/L (ref 0–1.3)
MONOCYTES NFR BLD AUTO: 7.4 %
NEUTROPHILS # BLD AUTO: 3.5 10E9/L (ref 1.6–8.3)
NEUTROPHILS NFR BLD AUTO: 64.3 %
NRBC # BLD AUTO: 0 10*3/UL
NRBC BLD AUTO-RTO: 0 /100
PLATELET # BLD AUTO: 295 10E9/L (ref 150–450)
RBC # BLD AUTO: 3.88 10E12/L (ref 3.8–5.2)
SPECIMEN EXP DATE BLD: NORMAL
WBC # BLD AUTO: 5.4 10E9/L (ref 4–11)

## 2018-09-25 PROCEDURE — 87389 HIV-1 AG W/HIV-1&-2 AB AG IA: CPT | Performed by: ADVANCED PRACTICE MIDWIFE

## 2018-09-25 PROCEDURE — 86762 RUBELLA ANTIBODY: CPT | Performed by: ADVANCED PRACTICE MIDWIFE

## 2018-09-25 PROCEDURE — 76817 TRANSVAGINAL US OBSTETRIC: CPT

## 2018-09-25 PROCEDURE — 85025 COMPLETE CBC W/AUTO DIFF WBC: CPT | Performed by: ADVANCED PRACTICE MIDWIFE

## 2018-09-25 PROCEDURE — G0463 HOSPITAL OUTPT CLINIC VISIT: HCPCS

## 2018-09-25 PROCEDURE — 86706 HEP B SURFACE ANTIBODY: CPT | Performed by: ADVANCED PRACTICE MIDWIFE

## 2018-09-25 PROCEDURE — 86850 RBC ANTIBODY SCREEN: CPT | Performed by: ADVANCED PRACTICE MIDWIFE

## 2018-09-25 PROCEDURE — 86780 TREPONEMA PALLIDUM: CPT | Performed by: ADVANCED PRACTICE MIDWIFE

## 2018-09-25 PROCEDURE — 87340 HEPATITIS B SURFACE AG IA: CPT | Performed by: ADVANCED PRACTICE MIDWIFE

## 2018-09-25 PROCEDURE — 86901 BLOOD TYPING SEROLOGIC RH(D): CPT | Performed by: ADVANCED PRACTICE MIDWIFE

## 2018-09-25 PROCEDURE — 82306 VITAMIN D 25 HYDROXY: CPT | Performed by: ADVANCED PRACTICE MIDWIFE

## 2018-09-25 PROCEDURE — 86900 BLOOD TYPING SEROLOGIC ABO: CPT | Performed by: ADVANCED PRACTICE MIDWIFE

## 2018-09-25 PROCEDURE — 36415 COLL VENOUS BLD VENIPUNCTURE: CPT | Performed by: ADVANCED PRACTICE MIDWIFE

## 2018-09-25 RX ORDER — AMITRIPTYLINE HYDROCHLORIDE 10 MG/1
10 TABLET ORAL AT BEDTIME
Qty: 30 TABLET | Refills: 3 | Status: SHIPPED | OUTPATIENT
Start: 2018-09-25 | End: 2018-09-25

## 2018-09-25 RX ORDER — AMITRIPTYLINE HYDROCHLORIDE 10 MG/1
10 TABLET ORAL AT BEDTIME
Qty: 30 TABLET | Refills: 3 | Status: SHIPPED | OUTPATIENT
Start: 2018-09-25

## 2018-09-25 ASSESSMENT — ENCOUNTER SYMPTOMS
POLYDIPSIA: 0
SWOLLEN GLANDS: 0
POLYPHAGIA: 0
HEADACHES: 0
BACK PAIN: 0
INSOMNIA: 1
SINUS CONGESTION: 0
SEIZURES: 0
FATIGUE: 0
JOINT SWELLING: 0
DYSPNEA ON EXERTION: 0
PARALYSIS: 0
COUGH: 0
DIARRHEA: 0
ALTERED TEMPERATURE REGULATION: 0
BRUISES/BLEEDS EASILY: 0
ABDOMINAL PAIN: 0
NERVOUS/ANXIOUS: 1
WEAKNESS: 0
CONSTIPATION: 0
CHILLS: 0
FEVER: 1
DECREASED APPETITE: 0
ARTHRALGIAS: 0

## 2018-09-25 NOTE — LETTER
9/25/2018       RE: Selina Pappas  [address]  [facility] [address]     Dear Colleague,    Thank you for referring your patient, Selina Pappas, to the [facility]  at [facility]. Please see a copy of my visit note below.    HPI  Patient is here for evaluation of insomnia. She reports that she has recently got pregnant. She will be moving to [place] next month. She has recently moved to [place], would have to move again. She states that she has been taking clonopin for sleep. She does not take it every night. She has been prescribed it for 8-9 years. She states that works 12 hour shifts. She has been to sleep clinic, however, has not been seen by Sleep Psychology. She has stopped the medication recently due to pregnancy. She reports that she usually goes to bed around 11-12. She reports that she tries to stay in bed. She does not watch TV in bed.     Review of Systems     Constitutional:  Positive for fever. Negative for chills, fatigue and decreased appetite.   HENT:  Negative for dry mouth and sinus congestion.    Respiratory:   Negative for cough and dyspnea on exertion.    Cardiovascular:  Negative for chest pain, dyspnea on exertion and edema.   Gastrointestinal:  Negative for abdominal pain, diarrhea and constipation.   Musculoskeletal:  Negative for back pain, joint swelling and arthralgias.   Skin:  Negative for itching and rash.   Neurological:  Negative for seizures, weakness, headaches and paralysis.   Endo/Heme:  Negative for anemia, swollen glands and bruises/bleeds easily.   Psychiatric/Behavioral:  Positive for mood swings.    Endocrine:  Negative for altered temperature regulation, polyphagia, polydipsia, unwanted hair growth and change in facial hair.    No current outpatient prescriptions on file.     No current facility-administered medications for this visit.      Past Medical History:   Diagnosis Date     Abnormal Pap  "smear of cervix 2005     Lymphocytic colitis      Mitral valve prolapse      Past Surgical History:   Procedure Laterality Date     LEEP TX, CERVICAL  2004     Social History     Social History     Marital status:      Spouse name: N/A     Number of children: N/A     Years of education: N/A     Occupational History     Not on file.     Social History Main Topics     Smoking status: Never Smoker     Smokeless tobacco: Never Used     Alcohol use Yes      Comment: maybe 2-4 times per month      Drug use: No     Sexual activity: Yes     Partners: Male     Birth control/ protection: None, Pull-out method     Other Topics Concern     Not on file     Social History Narrative       Vitals:    09/25/18 0811 09/25/18 0812 09/25/18 0813   BP: 91/61 94/60 94/60   Pulse: 81 82 77   Weight: 39.6 kg (87 lb 6.4 oz)     Height: 1.448 m (4' 9\")           Physical Exam   Constitutional: She is oriented to person, place, and time and well-developed, well-nourished, and in no distress.   HENT:   Head: Normocephalic and atraumatic.   Eyes: Conjunctivae are normal. Pupils are equal, round, and reactive to light.   Neck: Normal range of motion.   Cardiovascular: Normal rate.    Pulmonary/Chest: Effort normal.   Musculoskeletal: She exhibits no edema.   Neurological: She is alert and oriented to person, place, and time. Gait normal.   Skin: Skin is warm and dry.   Psychiatric: Mood, memory, affect and judgment normal.   Vitals reviewed.      Assessment and Plan:  Selina was seen today for establish care.    Diagnoses and all orders for this visit:    Psychophysiological insomnia. Reviewed management of insomnia with patient. Recommend lifestyle modifications to improve sleep hygiene. Referral to the patient today.  Recommend to sleep psychology to address insomnia.  Patient was also advised on medical management of insomnia.  Discussed amitriptyline and trazodone.  We will start low-dose amitriptyline at bedtime as it has good " safety profile for pregnancy.  Patient was advised on medication related side effects.  She will contact the clinic if her insomnia is not improving.  May consider use of zolpidem if patient has no sufficient relief of her symptoms.    -     SLEEP EVALUATION & MANAGEMENT REFERRAL - ADULT -[facility] [place] / [facility]  [phone] (Age 2 and up); Future  -     amitriptyline (ELAVIL) 10 MG tablet; Take 1 tablet (10 mg) by mouth At Bedtime      Total time spent 45  minutes.  More than 50% of the time spent with Ms. Pappas on counseling / coordinating her care    Yazmin Zaragoza MD            Again, thank you for allowing me to participate in the care of your patient.      Sincerely,    Yazmin Zaragoza MD

## 2018-09-25 NOTE — LETTER
Date:September 26, 2018      Patient was self referred, no letter generated. Do not send.        [facility] Information

## 2018-09-25 NOTE — PROGRESS NOTES
HPI  Patient is here for evaluation of insomnia. She reports that she has recently got pregnant. She will be moving to [place] next month. She has recently moved to [place], would have to move again. She states that she has been taking clonopin for sleep. She does not take it every night. She has been prescribed it for 8-9 years. She states that works 12 hour shifts. She has been to sleep clinic, however, has not been seen by Sleep Psychology. She has stopped the medication recently due to pregnancy. She reports that she usually goes to bed around 11-12. She reports that she tries to stay in bed. She does not watch TV in bed.     Review of Systems     Constitutional:  Positive for fever. Negative for chills, fatigue and decreased appetite.   HENT:  Negative for dry mouth and sinus congestion.    Respiratory:   Negative for cough and dyspnea on exertion.    Cardiovascular:  Negative for chest pain, dyspnea on exertion and edema.   Gastrointestinal:  Negative for abdominal pain, diarrhea and constipation.   Musculoskeletal:  Negative for back pain, joint swelling and arthralgias.   Skin:  Negative for itching and rash.   Neurological:  Negative for seizures, weakness, headaches and paralysis.   Endo/Heme:  Negative for anemia, swollen glands and bruises/bleeds easily.   Psychiatric/Behavioral:  Positive for mood swings.    Endocrine:  Negative for altered temperature regulation, polyphagia, polydipsia, unwanted hair growth and change in facial hair.    No current outpatient prescriptions on file.     No current facility-administered medications for this visit.      Past Medical History:   Diagnosis Date     Abnormal Pap smear of cervix 2005     Lymphocytic colitis      Mitral valve prolapse      Past Surgical History:   Procedure Laterality Date     LEEP TX, CERVICAL  2004     Social History     Social History     Marital status:      Spouse name: N/A     Number of children: N/A     Years of education: N/A  "    Occupational History     Not on file.     Social History Main Topics     Smoking status: Never Smoker     Smokeless tobacco: Never Used     Alcohol use Yes      Comment: maybe 2-4 times per month      Drug use: No     Sexual activity: Yes     Partners: Male     Birth control/ protection: None, Pull-out method     Other Topics Concern     Not on file     Social History Narrative       Vitals:    09/25/18 0811 09/25/18 0812 09/25/18 0813   BP: 91/61 94/60 94/60   Pulse: 81 82 77   Weight: 39.6 kg (87 lb 6.4 oz)     Height: 1.448 m (4' 9\")           Physical Exam   Constitutional: She is oriented to person, place, and time and well-developed, well-nourished, and in no distress.   HENT:   Head: Normocephalic and atraumatic.   Eyes: Conjunctivae are normal. Pupils are equal, round, and reactive to light.   Neck: Normal range of motion.   Cardiovascular: Normal rate.    Pulmonary/Chest: Effort normal.   Musculoskeletal: She exhibits no edema.   Neurological: She is alert and oriented to person, place, and time. Gait normal.   Skin: Skin is warm and dry.   Psychiatric: Mood, memory, affect and judgment normal.   Vitals reviewed.      Assessment and Plan:  Selina was seen today for establish care.    Diagnoses and all orders for this visit:    Psychophysiological insomnia. Reviewed management of insomnia with patient. Recommend lifestyle modifications to improve sleep hygiene. Referral to the patient today.  Recommend to sleep psychology to address insomnia.  Patient was also advised on medical management of insomnia.  Discussed amitriptyline and trazodone.  We will start low-dose amitriptyline at bedtime as it has good safety profile for pregnancy.  Patient was advised on medication related side effects.  She will contact the clinic if her insomnia is not improving.  May consider use of zolpidem if patient has no sufficient relief of her symptoms.    -     SLEEP EVALUATION & MANAGEMENT REFERRAL - [facility] - [place] / [facility]  [phone] (Age 2 and up); Future  -     amitriptyline (ELAVIL) 10 MG tablet; Take 1 tablet (10 mg) by mouth At Bedtime      Total time spent 45  minutes.  More than 50% of the time spent with Ms. Pappas on counseling / coordinating her care    Yazmin Zaragoza MD

## 2018-09-28 LAB
RUBV IGG SERPL IA-ACNC: 74 IU/ML
T PALLIDUM AB SER QL: NONREACTIVE

## 2018-10-01 ENCOUNTER — OFFICE VISIT (OUTPATIENT)
Dept: OBGYN | Facility: CLINIC | Age: 33
End: 2018-10-01
Attending: ADVANCED PRACTICE MIDWIFE
Payer: COMMERCIAL

## 2018-10-01 VITALS
DIASTOLIC BLOOD PRESSURE: 67 MMHG | BODY MASS INDEX: 19.16 KG/M2 | HEIGHT: 57 IN | HEART RATE: 80 BPM | SYSTOLIC BLOOD PRESSURE: 107 MMHG | WEIGHT: 88.8 LBS

## 2018-10-01 DIAGNOSIS — O09.91 SUPERVISION OF HIGH RISK PREGNANCY IN FIRST TRIMESTER: Primary | ICD-10-CM

## 2018-10-01 PROCEDURE — G0463 HOSPITAL OUTPT CLINIC VISIT: HCPCS | Mod: ZF

## 2018-10-01 PROCEDURE — 87086 URINE CULTURE/COLONY COUNT: CPT | Performed by: ADVANCED PRACTICE MIDWIFE

## 2018-10-01 ASSESSMENT — PAIN SCALES - GENERAL: PAINLEVEL: NO PAIN (0)

## 2018-10-01 NOTE — MR AVS SNAPSHOT
After Visit Summary   10/1/2018    Selina Pappas    MRN: [id number]           Patient Information     Date Of Birth          1985        Visit Information        Provider Department      10/1/2018 2:00 PM Jenna Amanda CNM [facility]        Today's Diagnoses     Supervision of high risk pregnancy in first trimester    -  1       Follow-ups after your visit        Follow-up notes from your care team     Return in about 2 weeks (around 10/15/2018) for ROBERTA- SCOTT.      Your next 10 appointments already scheduled     Oct 15, 2018  2:00 PM CDT   New Sleep Patient with ANTONIO Pierson [facility] ([facility])    [address] [place] [id number] [phone]            Oct 17, 2018  1:30 PM CDT   NEW OB with ANTONIO Verdin CNM   [facility] ([facility])    [facility] [address]  [address] [address] [phone]              Who to contact     Please call your clinic at [phone] to:    Ask questions about your health    Make or cancel appointments    Discuss your medicines    Learn about your test results    Speak to your doctor            Additional Information About Your Visit        MyChart Information     [employer] gives you secure access to your electronic health record. If you see a primary care provider, you can also send messages to your care team and make appointments. If you have questions, please call your primary care clinic.  If you do not have a primary care provider, please call [phone] and they will assist you.      [employer] is an electronic gateway that provides easy, online access to your medical records. With [employer], you can request a clinic appointment, read your test results, renew a prescription or communicate with your care team.     To access your  "existing account, please contact your [facility] or call [phone] for assistance.        Care EveryWhere ID     This is your Care EveryWhere ID. This could be used by other organizations to access your [facility] medical records  [id number]        Your Vitals Were     Pulse Height Last Period Breastfeeding? BMI (Body Mass Index)       80 1.448 m (4' 9\") 08/07/2018 No 19.22 kg/m2        Blood Pressure from Last 3 Encounters:   10/01/18 107/67   09/25/18 94/60   08/21/18 100/64    Weight from Last 3 Encounters:   10/01/18 40.3 kg (88 lb 12.8 oz)   09/25/18 39.6 kg (87 lb 6.4 oz)   08/21/18 39.5 kg (87 lb)              We Performed the Following     Urine Culture Aerobic Bacterial        Primary Care Provider Fax #    Physician No Ref-Primary [phone]       No address on file        Equal Access to Services     GALI HANCOCK : Hadii austin Whitehead, waelkin millan, qaleslye kaalmada ruy, mike nguyen . So [facility] [phone].    ATENCIÓN: Si habla español, tiene a rodrigues disposición servicios gratuitos de asistencia lingüística. Llame al [phone].    We comply with applicable federal civil rights laws and [place] laws. We do not discriminate on the basis of race, color, national origin, age, disability, sex, sexual orientation, or gender identity.            Thank you!     Thank you for choosing [facility]  for your care. Our goal is always to provide you with excellent care. Hearing back from our patients is one way we can continue to improve our services. Please take a few minutes to complete the written survey that you may receive in the mail after your visit with us. Thank you!             Your Updated Medication List - Protect others around you: Learn how to safely use, store and throw away your medicines at www.disposemymeds.org.          This list is accurate as of 10/1/18 11:59 PM.  Always use your most recent " med list.                   Brand Name Dispense Instructions for use Diagnosis    amitriptyline 10 MG tablet    ELAVIL    30 tablet    Take 1 tablet (10 mg) by mouth At Bedtime    Psychophysiological insomnia       UNISOM 25 MG Tabs tablet   Generic drug:  doxylamine      Take 25 mg by mouth At Bedtime

## 2018-10-01 NOTE — PROGRESS NOTES
[facility] OB Intake note  Subjective   33 year old woman presents to clinic for initiation of OB care.  Patient's last menstrual period was 2018.  at 7w6d by Estimated Date of Delivery: May 14, 2019 based on LMP, c/w dating ultrasound.  Reviewed dating ultrasound. Pregnancy is unplanned but welcomed.        Symptoms since LMP include nausea, bowel changes and fatigue.  Patient has tried these relief measures: diet modification, small frequent meals, increased rest and increased fluids.    Have you traveled during the pregnancy?No  Have your sexual partner(s) travelled during the pregnancy?No    - Current Medications   - Daily PNV  - Daily Fish Oil supplement   - Daily Vitamin D      OTHER:  - OBI labs previously done    - Patient reports she will be MOVING to [place] on 10/29 for permanent relocation with partner.    - Will likely return for at least one more appointment.    - Patient deferred the Urine GC/CT stating she completed a GC/CT swab collection test in Aug 2018 and has no concern regarding those results being changed.      - Hx of LEEP- with cone biopsy   - Recommended serial cervical lengths starting at 16  Weeks   - Cervical length today 3.3cm    - Hx of mitral valve prolapse  - FOB's brother passed away at 6 weeks old d/t heart defect   - Recommended fetal echocardiogram 22-24 weeks    - Insomnia- followed by Dr. Zaragoza for insomnia and she has place a referral for to the sleep clinic.    - Patient stated she stopped taking her prescribed Klonopin on Aug 21, 2018     - Colitis- takes no nsaids, otherwise no problems    Current Outpatient Prescriptions   Medication Sig Dispense Refill     doxylamine (UNISOM) 25 MG TABS tablet Take 25 mg by mouth At Bedtime       amitriptyline (ELAVIL) 10 MG tablet Take 1 tablet (10 mg) by mouth At Bedtime (Patient not taking: Reported on 10/1/2018) 30 tablet 3        - Co-morbids    Past Medical History:   Diagnosis Date     Abnormal Pap smear of cervix ([facility]  "Biopsy )       Lymphocytic colitis  (Not able to NSAIDS)       Mitral valve prolapse (Dx 6 years ago)         - The patient does not h/o Pre Eclampsia, Current multi fetal gestation, Pre Gestational Diabetes (Type 1 or Type 2), chronic hypertension, renal disease, Autoimmune disease (systematic lupus erythematosus, antiphospholipid syndrome) so WILL NOT start low dose aspirin (81mg) starting between 12 and 28 weeks to prevent preeclampsia.    - The patient  does not have a history of spontaneous  birth so  WILL NOT consider progesterone starting at 16-20 weeks and/or serial transvaginal cervical length ultrasounds from 16-24 weeks.         PERSONAL/SOCIAL HISTORY  FOB- \"Mark\"  Employment: Recently quit job to prepare to move to [place] in 5-6wks.  Her job involves moderate activity .  Additional items: Denies past or present domestic violence, sexual and psychological abuse.     Objective  -VS: reviewed and within normal limits   -General appearance: no acute distress, patient is comfortable   NEUROLOGICAL/PSYCHIATRIC   - Oriented x3,   -Mood and affect: : normal     Last pap 2018 NIL, negative HPV    Assessment/Plan  Selina was seen today for prenatal care.    Diagnoses and all orders for this visit:    Supervision of high risk pregnancy in first trimester  -     Urine Culture Aerobic Bacterial      33 year old  7w6d weeks of pregnancy with ALEJANDRINA of May 14, 2019 by LMP of Patient's last menstrual period was 2018.. Ultrasound confirms.   Outpatient Encounter Prescriptions as of 10/1/2018   Medication Sig Dispense Refill     doxylamine (UNISOM) 25 MG TABS tablet Take 25 mg by mouth At Bedtime       amitriptyline (ELAVIL) 10 MG tablet Take 1 tablet (10 mg) by mouth At Bedtime (Patient not taking: Reported on 10/1/2018) 30 tablet 3     No facility-administered encounter medications on file as of 10/1/2018.     No orders of the defined types were placed in this encounter.            " "      - Oriented to Practice, types of care, how to reach a provider, and flow of care throughout the course of pregancy to include frequency of appointments and US.  Pt undecided between  SCOTT vs MD.  - Patient received 1st trimester new OB education packet complete with aide of [employer] booklet including information on genetic screening test options.  - Patient would like to disucss options further at new OB visit.  She would like to contact her insurance company to inquire about coverage. Would likely complete at new clinic.  - Patient was encouraged to continue prenatal vitamins as tolerated.      - Reviewed OBI labs.  - Urine culture collected and sent.  - Declines GC/CT screening.   - Pap up to date.  - Declines MFM referrals as pt will be moving.  - Discussed recommendation for serial cervical length ultrasounds starting at 16 weeks d/t hx of cone biopsy.  - Discussed recommendation for fetal echocardiogram 22-24 weeks d/t personal hx and FOB's family history.       Pt to RTO for NOB visit in 2 weeks and prn if questions or concerns    \"I, Talha Hall RN, BETTINA am serving as a scribe to document services personally performed by ANTONIO Mcginnis CNM based on the provider's statements to me.\" -Talha Hall RN, BETTINA    The encounter was performed by me and scribed by the SNM.  The scribed note accurately reflects my personal services and decision made by me.        ANTONIO Mcginnis CNM         "

## 2018-10-01 NOTE — LETTER
Date:October 17, 2018      Patient was self referred, no letter generated. Do not send.        [facility] Information

## 2018-10-01 NOTE — LETTER
10/1/2018       RE: Selina Pappas  [address]  [facility] [address]     Dear Colleague,    Thank you for referring your patient, Selina Pappas, to the [facility] at [facility]. Please see a copy of my visit note below.    [facility] OB Intake note  Subjective   33 year old woman presents to clinic for initiation of OB care.  Patient's last menstrual period was 2018.  at 7w6d by Estimated Date of Delivery: May 14, 2019 based on LMP, c/w dating ultrasound.  Reviewed dating ultrasound. Pregnancy is unplanned but welcomed.        Symptoms since LMP include nausea, bowel changes and fatigue.  Patient has tried these relief measures: diet modification, small frequent meals, increased rest and increased fluids.    Have you traveled during the pregnancy?No  Have your sexual partner(s) travelled during the pregnancy?No    - Current Medications   - Daily PNV  - Daily Fish Oil supplement   - Daily Vitamin D      OTHER:  - OBI labs previously done    - Patient reports she will be MOVING to [place] on 10/29 for permanent relocation with partner.    - Will likely return for at least one more appointment.    - Patient deferred the Urine GC/CT stating she completed a GC/CT swab collection test in Aug 2018 and has no concern regarding those results being changed.      - Hx of LEEP- with cone biopsy   - Recommended serial cervical lengths starting at 16  Weeks   - Cervical length today 3.3cm    - Hx of mitral valve prolapse  - FOB's brother passed away at 6 weeks old d/t heart defect   - Recommended fetal echocardiogram 22-24 weeks    - Insomnia- followed by Dr. Zaragoza for insomnia and she has place a referral for to the sleep clinic.    - Patient stated she stopped taking her prescribed Klonopin on Aug 21, 2018     - Colitis- takes no nsaids, otherwise no problems    Current Outpatient Prescriptions   Medication Sig Dispense Refill     doxylamine  "(UNISOM) 25 MG TABS tablet Take 25 mg by mouth At Bedtime       amitriptyline (ELAVIL) 10 MG tablet Take 1 tablet (10 mg) by mouth At Bedtime (Patient not taking: Reported on 10/1/2018) 30 tablet 3        - Co-morbids    Past Medical History:   Diagnosis Date     Abnormal Pap smear of cervix ([facility] Biopsy )       Lymphocytic colitis  (Not able to NSAIDS)       Mitral valve prolapse (Dx 6 years ago)         - The patient does not h/o Pre Eclampsia, Current multi fetal gestation, Pre Gestational Diabetes (Type 1 or Type 2), chronic hypertension, renal disease, Autoimmune disease (systematic lupus erythematosus, antiphospholipid syndrome) so WILL NOT start low dose aspirin (81mg) starting between 12 and 28 weeks to prevent preeclampsia.    - The patient  does not have a history of spontaneous  birth so  WILL NOT consider progesterone starting at 16-20 weeks and/or serial transvaginal cervical length ultrasounds from 16-24 weeks.         PERSONAL/SOCIAL HISTORY  FOB- \"Mark\"  Employment: Recently quit job to prepare to move to [place] in 5-6wks.  Her job involves moderate activity .  Additional items: Denies past or present domestic violence, sexual and psychological abuse.     Objective  -VS: reviewed and within normal limits   -General appearance: no acute distress, patient is comfortable   NEUROLOGICAL/PSYCHIATRIC   - Oriented x3,   -Mood and affect: : normal     Last pap 2018 NIL, negative HPV    Assessment/Plan  Selina was seen today for prenatal care.    Diagnoses and all orders for this visit:    Supervision of high risk pregnancy in first trimester  -     Urine Culture Aerobic Bacterial      33 year old  7w6d weeks of pregnancy with ALEJANDRINA of May 14, 2019 by LMP of Patient's last menstrual period was 2018.. Ultrasound confirms.   Outpatient Encounter Prescriptions as of 10/1/2018   Medication Sig Dispense Refill     doxylamine (UNISOM) 25 MG TABS tablet Take 25 mg by mouth At " "Bedtime       amitriptyline (ELAVIL) 10 MG tablet Take 1 tablet (10 mg) by mouth At Bedtime (Patient not taking: Reported on 10/1/2018) 30 tablet 3     No facility-administered encounter medications on file as of 10/1/2018.     No orders of the defined types were placed in this encounter.                  - Oriented to Practice, types of care, how to reach a provider, and flow of care throughout the course of pregancy to include frequency of appointments and US.  Pt undecided between  SCOTT vs MD.  - Patient received 1st trimester new OB education packet complete with aide of [employer] booklet including information on genetic screening test options.  - Patient would like to disucss options further at new OB visit.  She would like to contact her insurance company to inquire about coverage. Would likely complete at new clinic.  - Patient was encouraged to continue prenatal vitamins as tolerated.      - Reviewed OBI labs.  - Urine culture collected and sent.  - Declines GC/CT screening.   - Pap up to date.  - Declines MFM referrals as pt will be moving.  - Discussed recommendation for serial cervical length ultrasounds starting at 16 weeks d/t hx of cone biopsy.  - Discussed recommendation for fetal echocardiogram 22-24 weeks d/t personal hx and FOB's family history.       Pt to RTO for NOB visit in 2 weeks and prn if questions or concerns    \"I, Talha Hall RN, BETTINA am serving as a scribe to document services personally performed by ANTONIO Mcginnis CNM based on the provider's statements to me.\" -Talha Hall RN, M    The encounter was performed by me and scribed by the SNM.  The scribed note accurately reflects my personal services and decision made by me.        ANTONIO Mcginnis CNM             Again, thank you for allowing me to participate in the care of your patient.      Sincerely,    Jenna Amanda CNM      "

## 2018-10-02 LAB
BACTERIA SPEC CULT: NORMAL
BACTERIA SPEC CULT: NORMAL
Lab: NORMAL
SPECIMEN SOURCE: NORMAL

## 2018-10-11 ENCOUNTER — PRE VISIT (OUTPATIENT)
Dept: SLEEP MEDICINE | Facility: CLINIC | Age: 33
End: 2018-10-11

## 2018-10-11 NOTE — TELEPHONE ENCOUNTER
"  1.  Reason for the visit:  Consult to discuss  2.  Referring provider and clinic name:  Dr. Zaragoza [facility]  3.  Previous Sleep Doctor or Pulmonlogist (clinic name)?  None  4.  Records, Procedures, Imaging, and Labs (see below)  No records to obtain        All NOTES from previous office visits that pertain to why they are being seen in the Sleep Center    Previous Sleep Studies, Chest CT, Echos and reports that pertain to why they are seeing Sleep Center    All Sleep records that have been done in the last 2 years that pertain to why they are seeing Sleep Center            Are they being seen for continuation of care for Cpap/Bipap/Avap/Trilogy/Dental Device? no    If yes to above Who and Where was Device issued/currently getting supplies from? no    Are you currently on \"Supplemental Oxygen\" during the day or night?   no                                                                                                                                                      Please remind pt to bring Cpap machine and ask to arrive 15 minutes early to appointment due traffic and congestion                                                 5. Pt Sleep Center Packet received pt will complete and bring to appt    Yes: \"please make sure that you bring this to your appointment completed, either the doctor will not see you until this completed or you may be asked to reschedule your appointment.\"     No: mail or email to the pt and explain, \"please make sure that you bring this to your appointment completed, either the doctor will not see you until this completed or you may be asked to reschedule your appointment.\"     ~If pt coming early to fill packet out, ask that they come 30 minutes prior to their appointment~     6. Has the pt's medication list been updated and preferred pharmacy added?     7. Has the allergy list been reviewed?    \"Thank you for choosing [facility] and we " "look forward to seeing you at your upcoming appointment\"     "

## 2018-10-14 ENCOUNTER — PRE VISIT (OUTPATIENT)
Dept: SLEEP MEDICINE | Facility: CLINIC | Age: 33
End: 2018-10-14

## 2018-10-15 ENCOUNTER — OFFICE VISIT (OUTPATIENT)
Dept: SLEEP MEDICINE | Facility: CLINIC | Age: 33
End: 2018-10-15
Payer: COMMERCIAL

## 2018-10-15 VITALS
OXYGEN SATURATION: 100 % | BODY MASS INDEX: 19.17 KG/M2 | HEIGHT: 57 IN | RESPIRATION RATE: 16 BRPM | SYSTOLIC BLOOD PRESSURE: 93 MMHG | WEIGHT: 88.85 LBS | HEART RATE: 74 BPM | DIASTOLIC BLOOD PRESSURE: 48 MMHG

## 2018-10-15 DIAGNOSIS — F51.04 PSYCHOPHYSIOLOGICAL INSOMNIA: ICD-10-CM

## 2018-10-15 DIAGNOSIS — G47.26 CIRCADIAN RHYTHM SLEEP DISORDER, SHIFT WORK TYPE: Primary | ICD-10-CM

## 2018-10-15 PROCEDURE — 99205 OFFICE O/P NEW HI 60 MIN: CPT | Performed by: NURSE PRACTITIONER

## 2018-10-15 ASSESSMENT — ENCOUNTER SYMPTOMS
HYPOTENSION: 0
LEG PAIN: 0
DEPRESSION: 1
DECREASED CONCENTRATION: 0
JAUNDICE: 0
EXERCISE INTOLERANCE: 0
NUMBNESS: 0
DIARRHEA: 0
HYPERTENSION: 0
SYNCOPE: 1
ORTHOPNEA: 0
BLOATING: 0
NERVOUS/ANXIOUS: 1
TINGLING: 0
HEARTBURN: 0
LOSS OF CONSCIOUSNESS: 1
HEADACHES: 0
SLEEP DISTURBANCES DUE TO BREATHING: 0
PALPITATIONS: 0
BOWEL INCONTINENCE: 0
PANIC: 0
CONSTIPATION: 1
PARALYSIS: 0
ABDOMINAL PAIN: 1
SEIZURES: 0
TREMORS: 0
BLOOD IN STOOL: 1
MEMORY LOSS: 0
WEAKNESS: 1
DISTURBANCES IN COORDINATION: 0
INSOMNIA: 1
SPEECH CHANGE: 0
NAUSEA: 1
LIGHT-HEADEDNESS: 1
DIZZINESS: 0
RECTAL PAIN: 1
VOMITING: 0

## 2018-10-15 ASSESSMENT — ANXIETY QUESTIONNAIRES
6. BECOMING EASILY ANNOYED OR IRRITABLE: SEVERAL DAYS
3. WORRYING TOO MUCH ABOUT DIFFERENT THINGS: SEVERAL DAYS
7. FEELING AFRAID AS IF SOMETHING AWFUL MIGHT HAPPEN: NOT AT ALL
GAD7 TOTAL SCORE: 5
4. TROUBLE RELAXING: SEVERAL DAYS
GAD7 TOTAL SCORE: 5
5. BEING SO RESTLESS THAT IT IS HARD TO SIT STILL: NOT AT ALL
7. FEELING AFRAID AS IF SOMETHING AWFUL MIGHT HAPPEN: NOT AT ALL
2. NOT BEING ABLE TO STOP OR CONTROL WORRYING: SEVERAL DAYS
1. FEELING NERVOUS, ANXIOUS, OR ON EDGE: SEVERAL DAYS

## 2018-10-15 NOTE — MR AVS SNAPSHOT
"              After Visit Summary   10/15/2018    Selina Pappas    MRN: [id number]           Patient Information     Date Of Birth          1985        Visit Information        Provider Department      10/15/2018 2:00 PM Jory Sun APRN CNP [facility]        Today's Diagnoses     Psychophysiological insomnia          Care Instructions        Shuti- free rubén  Wake up time  Same every time    Time spent in bed:  Reduce time in bed not devoted to sleep  Worry time: 3 hrs before bedtime.  Journal : list: to do list for next day, for week, thoughts/feelings/concerns  With wakeup: \"i've already done this work\"    Stimulus control:  Bed for sleep and sex only (as you are doing)  Enter bed only when sleepy  Cover clocks, set alarm for wakeup  Document on sleep logs when you wakeup with your alarm    Sleep restriction:  Sleep consolidation  Set wakeup time: 5:00  Set bedtime: 11:30  Nap: on time 30-40 mins when you get home, and on weekends    With Wakeups:  WHEN AWAKENED AT NIGHT:  Employ RELAXATION TECHNIQUES.  Practice one of your own or find one or 2 from the list below that is \"mind\" distracting and relaxing:  Insomnia - Relaxation  Stress, tension, and anxiety can be big factors contributing to insomnia.  If you can t relax your mind and body, then you won t be able to sleep.  You would likely benefit from trying some of the relaxation exercises that we ve assembled below.  These are all internet based links.  If you don t have or use a computer, you may benefit from one of the stress management books listed below that you can get at your public library or at a local bookstore for a relatively low price.      Insight timer:  For ideas for use at night      MANAGING WAKEUPS:  Up and out of bed when: 1. Not sleeping and start to think, \"I'm not falling asleep here\" or 2. you've been awake for 20 mins or more.  Up to chair doing something that is mind distracting (such as " sudoko, or crossword, or reading a book that is boring in low light, avoid screens)  Return to bed when feeling drowsy.    What are you going to do when sleepy when you don't want to be   At work:  Walk around,  Get water,  Some people are helped by:  low masood snack: celery  Brief trip out the door  Call a friend    Off of work:   Brooke. After lunch: brief timed nap  See above  Multitask-drawers, plants,        No driving if exceptionally sleepy    Depression/anxiety: consider a provider    Contact me after your appt:    Trial elavil till you see OB  Your BMI is Body mass index is 19.22 kg/(m^2).  Weight management is a personal decision.  If you are interested in exploring weight loss strategies, the following discussion covers the approaches that may be successful. Body mass index (BMI) is one way to tell whether you are at a healthy weight, overweight, or obese. It measures your weight in relation to your height.  A BMI of 18.5 to 24.9 is in the healthy range. A person with a BMI of 25 to 29.9 is considered overweight, and someone with a BMI of 30 or greater is considered obese. More than two-thirds of American adults are considered overweight or obese.  Being overweight or obese increases the risk for further weight gain. Excess weight may lead to heart disease and diabetes.  Creating and following plans for healthy eating and physical activity may help you improve your health.  Weight control is part of healthy lifestyle and includes exercise, emotional health, and healthy eating habits. Careful eating habits lifelong are the mainstay of weight control. Though there are significant health benefits from weight loss, long-term weight loss with diet alone may be very difficult to achieve- studies show long-term success with dietary management in less than 10% of people. Attaining a healthy weight may be especially difficult to achieve in those with severe obesity. In some cases, medications, devices and surgical  management might be considered.  What can you do?  If you are overweight or obese and are interested in methods for weight loss, you should discuss this with your provider.     Consider reducing daily calorie intake by 500 calories.     Keep a food journal.     Avoiding skipping meals, consider cutting portions instead.    Diet combined with exercise helps maintain muscle while optimizing fat loss. Strength training is particularly important for building and maintaining muscle mass. Exercise helps reduce stress, increase energy, and improves fitness. Increasing exercise without diet control, however, may not burn enough calories to loose weight.       Start walking three days a week 10-20 minutes at a time    Work towards walking thirty minutes five days a week     Eventually, increase the speed of your walking for 1-2 minutes at time    In addition, we recommend that you review healthy lifestyles and methods for weight loss available through the National Institutes of Health patient information sites:  http://win.niddk.nih.gov/publications/index.htm    And look into health and wellness programs that may be available through your health insurance provider, employer, local community center, or dominga club.                  Follow-ups after your visit        Your next 10 appointments already scheduled     Oct 17, 2018  1:30 PM CDT   NEW OB with ANTONIO Verdin CNM   [facility] ([facility])    [address] [facility] [address]  [address]  [address]  [facility] [address] [phone]              Who to contact     If you have questions or need follow up information about today's clinic visit or your schedule please contact [facility] directly at [phone].  Normal or non-critical lab and imaging results will be communicated to you by MyChart, letter or phone within 4 business days after the clinic has received the results. If you do not hear  "from us within 7 days, please contact the clinic through [email or web address] or phone. If you have a critical or abnormal lab result, we will notify you by phone as soon as possible.  Submit refill requests through [email or web address] or call your pharmacy and they will forward the refill request to us. Please allow 3 business days for your refill to be completed.          Additional Information About Your Visit        Office Maxhart Information     [email or web address] gives you secure access to your electronic health record. If you see a primary care provider, you can also send messages to your care team and make appointments. If you have questions, please call your primary care clinic.  If you do not have a primary care provider, please call [phone] and they will assist you.        Care EveryWhere ID     This is your Care EveryWhere ID. This could be used by other organizations to access your [facility] medical records  [id number]        Your Vitals Were     Pulse Respirations Height Last Period Pulse Oximetry BMI (Body Mass Index)    74 16 1.448 m (4' 9.01\") 08/07/2018 100% 19.22 kg/m2       Blood Pressure from Last 3 Encounters:   10/15/18 93/48   10/01/18 107/67   09/25/18 94/60    Weight from Last 3 Encounters:   10/15/18 40.3 kg (88 lb 13.5 oz)   10/01/18 40.3 kg (88 lb 12.8 oz)   09/25/18 39.6 kg (87 lb 6.4 oz)              We Performed the Following     SLEEP EVALUATION & MANAGEMENT REFERRAL - ADULT -[facility]  [phone] (Age 2 and up)        Primary Care Provider Office Phone # Fax #    Yazmin Esdras Zaragoza [phone]       [facility] [address]  [facility] [address]        Equal Access to Services     GALI HANCOCK : Lindsey Whitehead, americo millan, mike tee. So [facility] [phone].    ATENCIÓN: Si habla español, tiene a rodrigues disposición servicios gratuitos de asistencia lingüística. " Kaila saravia [phone].    We comply with applicable federal civil rights laws and [place] laws. We do not discriminate on the basis of race, color, national origin, age, disability, sex, sexual orientation, or gender identity.            Thank you!     Thank you for choosing [facility]  for your care. Our goal is always to provide you with excellent care. Hearing back from our patients is one way we can continue to improve our services. Please take a few minutes to complete the written survey that you may receive in the mail after your visit with us. Thank you!             Your Updated Medication List - Protect others around you: Learn how to safely use, store and throw away your medicines at www.disposemymeds.org.          This list is accurate as of 10/15/18  3:13 PM.  Always use your most recent med list.                   Brand Name Dispense Instructions for use Diagnosis    amitriptyline 10 MG tablet    ELAVIL    30 tablet    Take 1 tablet (10 mg) by mouth At Bedtime    Psychophysiological insomnia       UNISOM 25 MG Tabs tablet   Generic drug:  doxylamine      Take 25 mg by mouth At Bedtime

## 2018-10-15 NOTE — TELEPHONE ENCOUNTER
Patient is here for evaluation of insomnia. She reports that she has recently got pregnant. She will be moving to [place] next month. She has recently moved to [place], would have to move again. She states that she has been taking clonopin for sleep. She does not take it every night. She has been prescribed it for 8-9 years. She states that works 12 hour shifts. She has been to sleep clinic, however, has not been seen by Sleep Psychology. She has stopped the medication recently due to pregnancy. She reports that she usually goes to bed around 11-12. She reports that she tries to stay in bed. She does not watch TV in bed.      We will start low-dose amitriptyline at bedtime as it has good safety profile for pregnancy.  Patient was advised on medication related side effects.  She will contact the clinic if her insomnia is not improving.  May consider use of zolpidem if patient has no sufficient relief of her symptoms.    PORTER

## 2018-10-15 NOTE — PATIENT INSTRUCTIONS
"    Shuti- free rubén  Wake up time  Same every time    Time spent in bed:  Reduce time in bed not devoted to sleep  Worry time: 3 hrs before bedtime.  Journal : list: to do list for next day, for week, thoughts/feelings/concerns  With wakeup: \"i've already done this work\"    Stimulus control:  Bed for sleep and sex only (as you are doing)  Enter bed only when sleepy  Cover clocks, set alarm for wakeup  Document on sleep logs when you wakeup with your alarm    Sleep restriction:  Sleep consolidation  Set wakeup time: 5:00  Set bedtime: 11:30  Nap: on time 30-40 mins when you get home, and on weekends    With Wakeups:  WHEN AWAKENED AT NIGHT:  Employ RELAXATION TECHNIQUES.  Practice one of your own or find one or 2 from the list below that is \"mind\" distracting and relaxing:  Insomnia - Relaxation  Stress, tension, and anxiety can be big factors contributing to insomnia.  If you can t relax your mind and body, then you won t be able to sleep.  You would likely benefit from trying some of the relaxation exercises that we ve assembled below.  These are all internet based links.  If you don t have or use a computer, you may benefit from one of the stress management books listed below that you can get at your public library or at a local bookstore for a relatively low price.      Insight timer:  For ideas for use at night      MANAGING WAKEUPS:  Up and out of bed when: 1. Not sleeping and start to think, \"I'm not falling asleep here\" or 2. you've been awake for 20 mins or more.  Up to chair doing something that is mind distracting (such as sudoko, or crossword, or reading a book that is boring in low light, avoid screens)  Return to bed when feeling drowsy.    What are you going to do when sleepy when you don't want to be   At work:  Walk around,  Get water,  Some people are helped by:  low masood snack: celery  Brief trip out the door  Call a friend    Off of work:   Brooke. After lunch: brief timed nap  See " above  Multitask-drawers, plants,        No driving if exceptionally sleepy    Depression/anxiety: consider a provider    Contact me after your appt:    Trial elavil till you see OB  Your BMI is Body mass index is 19.22 kg/(m^2).  Weight management is a personal decision.  If you are interested in exploring weight loss strategies, the following discussion covers the approaches that may be successful. Body mass index (BMI) is one way to tell whether you are at a healthy weight, overweight, or obese. It measures your weight in relation to your height.  A BMI of 18.5 to 24.9 is in the healthy range. A person with a BMI of 25 to 29.9 is considered overweight, and someone with a BMI of 30 or greater is considered obese. More than two-thirds of American adults are considered overweight or obese.  Being overweight or obese increases the risk for further weight gain. Excess weight may lead to heart disease and diabetes.  Creating and following plans for healthy eating and physical activity may help you improve your health.  Weight control is part of healthy lifestyle and includes exercise, emotional health, and healthy eating habits. Careful eating habits lifelong are the mainstay of weight control. Though there are significant health benefits from weight loss, long-term weight loss with diet alone may be very difficult to achieve- studies show long-term success with dietary management in less than 10% of people. Attaining a healthy weight may be especially difficult to achieve in those with severe obesity. In some cases, medications, devices and surgical management might be considered.  What can you do?  If you are overweight or obese and are interested in methods for weight loss, you should discuss this with your provider.     Consider reducing daily calorie intake by 500 calories.     Keep a food journal.     Avoiding skipping meals, consider cutting portions instead.    Diet combined with exercise helps maintain muscle  while optimizing fat loss. Strength training is particularly important for building and maintaining muscle mass. Exercise helps reduce stress, increase energy, and improves fitness. Increasing exercise without diet control, however, may not burn enough calories to loose weight.       Start walking three days a week 10-20 minutes at a time    Work towards walking thirty minutes five days a week     Eventually, increase the speed of your walking for 1-2 minutes at time    In addition, we recommend that you review healthy lifestyles and methods for weight loss available through the National Institutes of Health patient information sites:  http://win.niddk.nih.gov/publications/index.htm    And look into health and wellness programs that may be available through your health insurance provider, employer, local community center, or dominga club.

## 2018-10-15 NOTE — PROGRESS NOTES
"Allergies:    No Known Allergies    Medications:    Current Outpatient Prescriptions   Medication Sig Dispense Refill     amitriptyline (ELAVIL) 10 MG tablet Take 1 tablet (10 mg) by mouth At Bedtime 30 tablet 3     doxylamine (UNISOM) 25 MG TABS tablet Take 25 mg by mouth At Bedtime         Problem List:  Patient Active Problem List    Diagnosis Date Noted     Supervision of high risk pregnancy in first trimester 10/01/2018     Priority: Medium     10/1/18: MOVING to [place] on 10/29  Needs GC/CT at [facility]  - Recommendations made include:   - Serial cervical length ultrasounds starting at 16 weeks d/t hx of Leep with cone   - Fetal echocardiogram 22-24 weeks d/t personal hx of mitral valve prolapse and FOB's brother with hx of heart defect, passed away  - Declines 1st trimester screening; declines orders for level 2  As she will be moving         Palpitations 06/26/2018     Priority: Medium     MVP (mitral valve prolapse) 06/26/2018     Priority: Medium     Mild  Echo 3/17       Lymphocytic colitis 06/26/2018     Priority: Medium     Colonoscopy Dec 2016  Avoids NSAIDs          Past Medical/Surgical History:  Past Medical History:   Diagnosis Date     Abnormal Pap smear of cervix 2005     History of LEEP (loop electrosurgical excision procedure) of cervix complicating pregnancy     cone     Lymphocytic colitis      Mitral valve prolapse      Past Surgical History:   Procedure Laterality Date     D & C  08/2018     LEEP TX, CERVICAL  2005           Physical Examination:  Vitals: BP 93/48  Pulse 74  Resp 16  Ht 1.448 m (4' 9.01\")  Wt 40.3 kg (88 lb 13.5 oz)  LMP 08/07/2018  SpO2 100%  BMI 19.22 kg/m2  BMI= Body mass index is 19.22 kg/(m^2).    Neck Cir (cm): 30 cm    [place] Total Score 10/15/2018   Total score - [place] 5       GENERAL APPEARANCE: healthy, alert and no distress  CC: Yazmin Redman Midwife        "

## 2018-10-15 NOTE — Clinical Note
See completed consult.  Please contact me with questions. [phone] ANTONIO Calderon, CNP-BC Sleep Medicine

## 2018-10-15 NOTE — NURSING NOTE
"    Chief Complaint   Patient presents with     Consult     Sleep Problem       Initial BP 93/48  Pulse 74  Resp 16  Ht 1.448 m (4' 9.01\")  Wt 40.3 kg (88 lb 13.5 oz)  LMP 08/07/2018  SpO2 100%  BMI 19.22 kg/m2 Estimated body mass index is 19.22 kg/(m^2) as calculated from the following:    Height as of this encounter: 1.448 m (4' 9.01\").    Weight as of this encounter: 40.3 kg (88 lb 13.5 oz).    Medication Reconciliation: complete    Neck circumference: 11 inches / 30 centimeters.    DME:     Cally Buckley [facility]       "

## 2018-10-17 ENCOUNTER — OFFICE VISIT (OUTPATIENT)
Dept: OBGYN | Facility: CLINIC | Age: 33
End: 2018-10-17
Attending: MIDWIFE
Payer: COMMERCIAL

## 2018-10-17 VITALS
BODY MASS INDEX: 19.51 KG/M2 | SYSTOLIC BLOOD PRESSURE: 95 MMHG | WEIGHT: 90.2 LBS | DIASTOLIC BLOOD PRESSURE: 66 MMHG | HEART RATE: 81 BPM

## 2018-10-17 DIAGNOSIS — O09.91 SUPERVISION OF HIGH RISK PREGNANCY IN FIRST TRIMESTER: Primary | ICD-10-CM

## 2018-10-17 PROCEDURE — G0463 HOSPITAL OUTPT CLINIC VISIT: HCPCS | Mod: ZF

## 2018-10-17 ASSESSMENT — ANXIETY QUESTIONNAIRES
6. BECOMING EASILY ANNOYED OR IRRITABLE: SEVERAL DAYS
2. NOT BEING ABLE TO STOP OR CONTROL WORRYING: NOT AT ALL
GAD7 TOTAL SCORE: 3
1. FEELING NERVOUS, ANXIOUS, OR ON EDGE: SEVERAL DAYS
7. FEELING AFRAID AS IF SOMETHING AWFUL MIGHT HAPPEN: NOT AT ALL
5. BEING SO RESTLESS THAT IT IS HARD TO SIT STILL: NOT AT ALL
3. WORRYING TOO MUCH ABOUT DIFFERENT THINGS: NOT AT ALL

## 2018-10-17 ASSESSMENT — PATIENT HEALTH QUESTIONNAIRE - PHQ9: 5. POOR APPETITE OR OVEREATING: SEVERAL DAYS

## 2018-10-17 ASSESSMENT — PAIN SCALES - GENERAL: PAINLEVEL: NO PAIN (0)

## 2018-10-17 NOTE — PROGRESS NOTES
SUBJECTIVE:   Selina is a 33 year old female who presents to clinic for a new OB visit.   at 10w1d with Estimated Date of Delivery: May 14, 2019 based on US 18 confirms. Feels well. Has started PNV 18. Occasional lightheadedness with standing for long periods that resolves sitting down. No other concerns or complaints.     She has not had bleeding since her LMP.   She has had mild intermittent nausea w/o vomiting. Weight loss has not occurred.   This was not a planned pregnancy but it is welcomed.   FOB is involved    Civil union with FOB   OTHER CONCERNS: Insomnia, recently seen a sleep specialist who recommended more consistent use of her Unisom     ===========================================   ROS: 10 point ROS neg other than the symptoms noted above in the HPI.      PSYCHIATRIC:  Denies mood changes  PHQ-9 score:  No flowsheet data found.  LD-7 SCORE 10/15/2018   Total Score 5 (mild anxiety)   Total Score 5         Past History:  Her past medical history   Past Medical History:   Diagnosis Date     Abnormal Pap smear of cervix      History of LEEP (loop electrosurgical excision procedure) of cervix complicating pregnancy     cone     Lymphocytic colitis      Mitral valve prolapse    .   This is her 2nd pregnancy, early miscarriage with 1st.   Since her last LMP she denies use of alcohol, tobacco and street drugs.  HISTORY:  Family History   Problem Relation Age of Onset     Hyperlipidemia Mother      Hyperlipidemia Father      Hypertension Father      Osteoporosis Maternal Grandmother      Lung Cancer Maternal Grandfather      Breast Cancer Paternal Grandmother      Alcoholism Paternal Grandfather      Social History     Social History     Marital status:      Spouse name: Mark      Number of children: N/A     Years of education: N/A     Occupational History     RN  [employer]     Social History Main Topics     Smoking status: Never Smoker     Smokeless tobacco:  Never Used     Alcohol use No      Comment: maybe 2-4 times per month PRIOR to Pregnancy     Drug use: No     Sexual activity: Yes     Partners: Male     Birth control/ protection: Pull-out method, None     Other Topics Concern     None     Social History Narrative     Current Outpatient Prescriptions   Medication Sig     amitriptyline (ELAVIL) 10 MG tablet Take 1 tablet (10 mg) by mouth At Bedtime     doxylamine (UNISOM) 25 MG TABS tablet Take 25 mg by mouth At Bedtime     No current facility-administered medications for this visit.      No Known Allergies    ============================================  MEDICAL HISTORY  Past Medical History:   Diagnosis Date     Abnormal Pap smear of cervix      History of LEEP (loop electrosurgical excision procedure) of cervix complicating pregnancy     cone     Lymphocytic colitis      Mitral valve prolapse      Past Surgical History:   Procedure Laterality Date     D & C  2018     LEEP TX, CERVICAL         Obstetric History       T0      L0     SAB1   TAB0   Ectopic0   Multiple0   Live Births0       # Outcome Date GA Lbr Jos/2nd Weight Sex Delivery Anes PTL Lv   2 Current            1 SAB 2017 6w0d                   I personally reviewed the past social/family/medical and surgical history on the date of service.   I reviewed lab work done at Intake visit with patient.    EXAM:  BP 95/66 (BP Location: Left arm, Patient Position: Sitting, Cuff Size: Adult Regular)  Pulse 81  Wt 40.9 kg (90 lb 3.2 oz)  LMP 2018  BMI 19.51 kg/m2   EXAM:  GENERAL:  Pleasant pregnant female, alert, cooperative and well groomed.  SKIN:  Warm and dry, without lesions or rashes  HEAD: Symmetrical features.  MOUTH:  Buccal mucosa pink, moist without lesions.    NECK:  Thyroid with nodules, followed by endocrine  LUNGS:  Clear to auscultation.  Breast: Deferred, recent exam   HEART:  RRR, 2/6 systolic murmur consistent with known MVP   ABDOMEN: Gravid. Soft  without masses , tenderness or organomegaly.  No CVA tenderness. Fetal heart tones present. Uterus is 10 week size.   MUSCULOSKELETAL:  Full range of motion  EXTREMITIES:  No edema. No significant varicosities.   PELVIC EXAM: deferred.     Lab Results   Component Value Date    PAP NIL 2018          ASSESSMENT:  33 year old , 10w1d weeks of pregnancy with ALEJANDRINA of May 14, 2019 by LMP  Intrauterine pregnancy 10w1d size 18 US consistent with dates  Genetic Screening: Declined       PLAN:  - Reviewed use of triage nurse line and contacting the on-call provider after hours for an urgent need such as fever, vagina bleeding, bladder or vaginal infection, rupture of membranes,  or term labor.    - Reviewed best evidence for: weight gain for her weight and height for pregnancy:  Based on pre-pregnancy weight of  and Body mass index is 19.51 kg/(m^2). RECOMMENDED WEIGHT GAIN: 25-35 lbs.  -If BMI>=30, weight gain/exercise handout given and reviewed. BMI entered on problem list with plan for possible referrals and  testing  - Reviewed healthy diet and foods to avoid; exercise and activity during pregnancy; avoiding exposure to toxoplasmosis; and maintenance of a generally healthy lifestyle.   - Discussed the harms, benefits, side effects and alternative therapies for current prescribed and OTC medications.  - All pt's questions discussed and answered.  Pt verbalized understanding of and agreement to plan of care.   - [facility] to women's health in [place], [place]   Records printed today   Carmen Redman CNM APRN

## 2018-10-17 NOTE — LETTER
10/17/2018       RE: Selina Pappas  [address]  [facility] [address]     Dear Colleague,    Thank you for referring your patient, Selina Pappas, to the [facility] at [facility]. Please see a copy of my visit note below.    SUBJECTIVE:   Selina is a 33 year old female who presents to clinic for a new OB visit.   at 10w1d with Estimated Date of Delivery: May 14, 2019 based on US 18 confirms. Feels well. Has started PNV 18. Occasional lightheadedness with standing for long periods that resolves sitting down. No other concerns or complaints.     She has not had bleeding since her LMP.   She has had mild intermittent nausea w/o vomiting. Weight loss has not occurred.   This was not a planned pregnancy but it is welcomed.   FOB is involved    Civil union with FOB   OTHER CONCERNS: Insomnia, recently seen a sleep specialist who recommended more consistent use of her Unisom     ===========================================   ROS: 10 point ROS neg other than the symptoms noted above in the HPI.      PSYCHIATRIC:  Denies mood changes  PHQ-9 score:  No flowsheet data found.  LD-7 SCORE 10/15/2018   Total Score 5 (mild anxiety)   Total Score 5         Past History:  Her past medical history   Past Medical History:   Diagnosis Date     Abnormal Pap smear of cervix      History of LEEP (loop electrosurgical excision procedure) of cervix complicating pregnancy     cone     Lymphocytic colitis      Mitral valve prolapse    .   This is her 2nd pregnancy, early miscarriage with 1st.   Since her last LMP she denies use of alcohol, tobacco and street drugs.  HISTORY:  Family History   Problem Relation Age of Onset     Hyperlipidemia Mother      Hyperlipidemia Father      Hypertension Father      Osteoporosis Maternal Grandmother      Lung Cancer Maternal Grandfather      Breast Cancer Paternal Grandmother      Alcoholism Paternal Grandfather       Social History     Social History     Marital status:      Spouse name: Mark      Number of children: N/A     Years of education: N/A     Occupational History     RN  [employer]     Social History Main Topics     Smoking status: Never Smoker     Smokeless tobacco: Never Used     Alcohol use No      Comment: maybe 2-4 times per month PRIOR to Pregnancy     Drug use: No     Sexual activity: Yes     Partners: Male     Birth control/ protection: Pull-out method, None     Other Topics Concern     None     Social History Narrative     Current Outpatient Prescriptions   Medication Sig     amitriptyline (ELAVIL) 10 MG tablet Take 1 tablet (10 mg) by mouth At Bedtime     doxylamine (UNISOM) 25 MG TABS tablet Take 25 mg by mouth At Bedtime     No current facility-administered medications for this visit.      No Known Allergies    ============================================  MEDICAL HISTORY  Past Medical History:   Diagnosis Date     Abnormal Pap smear of cervix      History of LEEP (loop electrosurgical excision procedure) of cervix complicating pregnancy     cone     Lymphocytic colitis      Mitral valve prolapse      Past Surgical History:   Procedure Laterality Date     D & C  2018     LEEP TX, CERVICAL  2005       Obstetric History       T0      L0     SAB1   TAB0   Ectopic0   Multiple0   Live Births0       # Outcome Date GA Lbr Jos/2nd Weight Sex Delivery Anes PTL Lv   2 Current            1 2017 6w0d                   I personally reviewed the past social/family/medical and surgical history on the date of service.   I reviewed lab work done at Intake visit with patient.    EXAM:  BP 95/66 (BP Location: Left arm, Patient Position: Sitting, Cuff Size: Adult Regular)  Pulse 81  Wt 40.9 kg (90 lb 3.2 oz)  LMP 2018  BMI 19.51 kg/m2   EXAM:  GENERAL:  Pleasant pregnant female, alert, cooperative and well groomed.  SKIN:  Warm and dry, without lesions or  rashes  HEAD: Symmetrical features.  MOUTH:  Buccal mucosa pink, moist without lesions.    NECK:  Thyroid with nodules, followed by endocrine  LUNGS:  Clear to auscultation.  Breast: Deferred, recent exam   HEART:  RRR, 2/6 systolic murmur consistent with known MVP   ABDOMEN: Gravid. Soft without masses , tenderness or organomegaly.  No CVA tenderness. Fetal heart tones present. Uterus is 10 week size.   MUSCULOSKELETAL:  Full range of motion  EXTREMITIES:  No edema. No significant varicosities.   PELVIC EXAM: deferred.     Lab Results   Component Value Date    PAP NIL 2018      ASSESSMENT:  33 year old , 10w1d weeks of pregnancy with ALEJANDRINA of May 14, 2019 by LMP  Intrauterine pregnancy 10w1d size 18 US consistent with dates  Genetic Screening: Declined     PLAN:  - Reviewed use of triage nurse line and contacting the on-call provider after hours for an urgent need such as fever, vagina bleeding, bladder or vaginal infection, rupture of membranes,  or term labor.    - Reviewed best evidence for: weight gain for her weight and height for pregnancy:  Based on pre-pregnancy weight of  and Body mass index is 19.51 kg/(m^2). RECOMMENDED WEIGHT GAIN: 25-35 lbs.  -If BMI>=30, weight gain/exercise handout given and reviewed. BMI entered on problem list with plan for possible referrals and  testing  - Reviewed healthy diet and foods to avoid; exercise and activity during pregnancy; avoiding exposure to toxoplasmosis; and maintenance of a generally healthy lifestyle.   - Discussed the harms, benefits, side effects and alternative therapies for current prescribed and OTC medications.  - All pt's questions discussed and answered.  Pt verbalized understanding of and agreement to plan of care.   - [facility] to women's health in [place], [place]   Records printed today       Carmen Redman CNM APRN

## 2018-10-17 NOTE — MR AVS SNAPSHOT
After Visit Summary   10/17/2018    Selina Pappas    MRN: [id number]           Patient Information     Date Of Birth          1985        Visit Information        Provider Department      10/17/2018 1:30 PM Isabela Redman APRN CNM [facility]        Today's Diagnoses     Supervision of high risk pregnancy in first trimester    -  1       Follow-ups after your visit        Who to contact     Please call your clinic at [phone] to:    Ask questions about your health    Make or cancel appointments    Discuss your medicines    Learn about your test results    Speak to your doctor            Additional Information About Your Visit        MyChart Information     [employer] gives you secure access to your electronic health record. If you see a primary care provider, you can also send messages to your care team and make appointments. If you have questions, please call your primary care clinic.  If you do not have a primary care provider, please call [phone] and they will assist you.      [employer] is an electronic gateway that provides easy, online access to your medical records. With [employer], you can request a clinic appointment, read your test results, renew a prescription or communicate with your care team.     To access your existing account, please contact your [facility] or call [phone] for assistance.        Care EveryWhere ID     This is your Care EveryWhere ID. This could be used by other organizations to access your [place] medical records  [id number]        Your Vitals Were     Pulse Last Period BMI (Body Mass Index)             81 08/07/2018 19.51 kg/m2          Blood Pressure from Last 3 Encounters:   10/17/18 95/66   10/15/18 93/48   10/01/18 107/67    Weight from Last 3 Encounters:   10/17/18 40.9 kg (90 lb 3.2 oz)   10/15/18 40.3 kg (88 lb 13.5 oz)   10/01/18 40.3 kg (88 lb 12.8 oz)              Today, you had the  following     No orders found for display       Primary Care Provider Office Phone # Fax #    Yazmin Esdras Zaragoza [phone]       [facility] [address]  [facility] [address]        Equal Access to Services     GALI HANCOCK : Hadii austin ku hadfidelo Soomaali, waaxda luqadaha, qaybta kaalmada ademarco ayada, mike alexin hayaan alexmarco a espinoza wendy laureano. So [facility] [phone].    ATENCIÓN: Si habla español, tiene a rodrigues disposición servicios gratuitos de asistencia lingüística. Llame al [phone].    We comply with applicable federal civil rights laws and [place] laws. We do not discriminate on the basis of race, color, national origin, age, disability, sex, sexual orientation, or gender identity.            Thank you!     Thank you for choosing [facility]  for your care. Our goal is always to provide you with excellent care. Hearing back from our patients is one way we can continue to improve our services. Please take a few minutes to complete the written survey that you may receive in the mail after your visit with us. Thank you!             Your Updated Medication List - Protect others around you: Learn how to safely use, store and throw away your medicines at www.disposemymeds.org.          This list is accurate as of 10/17/18  2:37 PM.  Always use your most recent med list.                   Brand Name Dispense Instructions for use Diagnosis    amitriptyline 10 MG tablet    ELAVIL    30 tablet    Take 1 tablet (10 mg) by mouth At Bedtime    Psychophysiological insomnia       UNISOM 25 MG Tabs tablet   Generic drug:  doxylamine      Take 25 mg by mouth At Bedtime

## 2018-10-17 NOTE — NURSING NOTE
Chief Complaint   Patient presents with     Prenatal Care     10 weeks 1 day     Patient states she has no further questions.informed patient of flu vaccine patient did not deny or confirm. Cydney Hood CMA on 10/17/2018 at 1:38 PM

## 2018-10-17 NOTE — PROGRESS NOTES
Visit Date:   10/15/2018      LOCATIONS:  [facility], [place].      CHIEF COMPLAINT:  I have been asked by Yazmin Zaragoza MD, to see Ms. Pappas in consultation for problems with difficulty with sleeping.      HISTORY OF PRESENT ILLNESS:  The patient reports a longstanding circadian rhythm disorder diagnosed in 2014.  She was recommended to take melatonin and over time continued to have problems with falling asleep, was prescribed Klonopin which worked quite well for her.  She took that for a significant period of time; however, she found out 1.5 weeks ago that she is pregnant, and stopped taking Klonopin as it is not recommended.  Other meds that she has taken in the past are Benadryl, which basically did not make a difference, unisom which was somewhat helpful.  She has tried meditation.  She had done 10 days of silence.  She has not tried CBTI and has not tried Ambien.  Recently she has returned from [place] and says that her schedule seems to be more in alignment since she has returned.  She does report that sleep can vacillate from being exceptionally poor to not so bad.  She does work 12-hour shifts, usually working 9A-9P or 10A-10P and on occasion will then double back working 11A-11P.  This will not be much of an issue for her after Friday as she is moving to [place] and will be unemployed for a period of time.      Her usual time to enter bed is around 11:30P.  She was a rise time at 8:00 a.m.  On weekends she may go to bed at midnight and getting up anywhere as late as maybe 1:00 in the afternoon.  At times when she does switch over from working nights to days, she will come home from work, sleep from 6A-10A and then be up for the rest of the day and then start her night sleeping.  Sleep latency is 1-8 hours.  Wakes up anywhere from 1-5 times a night.  She feels that it takes at least 20 minutes to re-fall back asleep with her wakeups.  While she is awake, she will check the clock.  She  states that she does not worry, but her mind is quite engaged.  She does not feel like getting out of bed.  The total sleep time on most work nights from 4-6 hours.  She feels her best if she gets 8.  When she does nap it might be for 2-3 hours, but usually does not nap.  Activities in bed do involve using a computer.  She denies restless legs.  On occasion may have a nightmare, does not affect her daytime function.  Does grind her teeth and wakes up with a headache 3 days a week.  No signs of sleep apnea-hypopnea syndrome symptoms.  Sleeps primarily on her sides.  No signs of orexin deficiency.      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  Sleep environment is conducive to good sleep.  Family history, snores, no other sleep disorders.  No alcohol or no recreational, medical use.  Her Lookout Sleepiness Scale today is 5, with a 3 for lying down to rest in the afternoon.  Denies that sleepiness does affect her driving or her performance at work.  30/30 days she is tired and fatigued, and 30/30 days mental health is not good.      REVIEW OF SYSTEMS:  A 14-point comprehensive review of systems completed and negative with the exception of the following:   CARDIOVASCULAR:  Has a history of mitral valve prolapse and on occasion gets some chest pressure.  No shortness of breath with activity at this point.     DIGESTIVE:  No nausea and vomiting, some constipation and belly pain.   MENTAL HEALTH:  Does have problems with anxiety.      PAST MEDICAL HISTORY:  Surgical:  LEEP in 2005, a D and C in 2017.  TMJ; mitral valve prolapse, I believe it says vasovagal; lymphocytic colitis; and neck pain with cervical arthritis.      MEDICATIONS:  Prenatal vitamins, fish oil and Elavil was started by Dr. Zaragoza.        MARY scale does show a result of 14/28.     Allergies:    No Known Allergies    Medications:    Current Outpatient Prescriptions  "  Medication Sig Dispense Refill     amitriptyline (ELAVIL) 10 MG tablet Take 1 tablet (10 mg) by mouth At Bedtime 30 tablet 3     doxylamine (UNISOM) 25 MG TABS tablet Take 25 mg by mouth At Bedtime         Problem List:  Patient Active Problem List    Diagnosis Date Noted     Supervision of high risk pregnancy in first trimester 10/01/2018     Priority: Medium     10/1/18: MOVING to [place] on 10/29  Needs GC/CT at [facility]  - Recommendations made include:   - Serial cervical length ultrasounds starting at 16 weeks d/t hx of Leep with cone   - Fetal echocardiogram 22-24 weeks d/t personal hx of mitral valve prolapse and FOB's brother with hx of heart defect, passed away  - Declines 1st trimester screening; declines orders for level 2  As she will be moving         Palpitations 06/26/2018     Priority: Medium     MVP (mitral valve prolapse) 06/26/2018     Priority: Medium     Mild  Echo 3/17       Lymphocytic colitis 06/26/2018     Priority: Medium     Colonoscopy Dec 2016  Avoids NSAIDs          Past Medical/Surgical History:  Past Medical History:   Diagnosis Date     Abnormal Pap smear of cervix 2005     History of LEEP (loop electrosurgical excision procedure) of cervix complicating pregnancy     cone     Lymphocytic colitis      Mitral valve prolapse      Past Surgical History:   Procedure Laterality Date     D & C  08/2018     LEEP TX, CERVICAL  2005           Physical Examination:  Vitals: BP 93/48  Pulse 74  Resp 16  Ht 1.448 m (4' 9.01\")  Wt 40.3 kg (88 lb 13.5 oz)  LMP 08/07/2018  SpO2 100%  BMI 19.22 kg/m2  BMI= Body mass index is 19.22 kg/(m^2).    Neck Cir (cm): 30 cm    Mabie Total Score 10/15/2018   Total score - Mabie 5       GENERAL APPEARANCE: healthy, alert and no distress     ASSESSMENT AND PLAN:  It is my impression that Selina, who is moving to [place] at the end of this week, has tried her Elavil twice, once seemed to give her about 6 hours of sleep, which she thought was really " pretty good.  Another time she felt a bit under the weather after she took it, work up a few times and had a bad mood.  She is uncertain as to whether this was related to the pill or if it was related to being exceptionally sleepy and pregnant.  We have discussed a number of factors regarding insomnia and the following plan of care has been developed:   1.  Insomnia.  Longstanding with a typical pattern of maybe 6 bad nights in a week followed by a good day.  We did discuss the Harriet's 3P model of insomnia and talked about perpetuating factors that might be contributing to her insomnia at this point.  She really does not want to consider stimulus control measures such as getting worry out of the bed, covering the clock, getting out of bed if not sleepy, and we did touch on the her upcoming visit at the OB clinic who often considers Ambien.  She is afraid that she would sleepwalk and have problems if she is on Ambien; however, she has never slept walked and has no symptoms of restless legs, often seen with those who walk or do amnestic behaviors while on Ambien.  We discussed the norms of insomnia and arousals throughout the night's rest and the tenants of CBTI and its effectiveness in treating insomnia.  At this point in time, she is unable to commit to anything.  We did decide that perhaps it may be feasible and reasonable to consider another trial or 2 of the Elavil to see if she gets any effects on improved ability to fall and stay asleep.  I will send a copy of this note to her provider, where she will be seen on Wednesday.  She has signed an KUSUM for this consult note to go to Carmen Redman, midwife, who she will be seeing on Wednesday.  Her Elavil dose is 10 mg and at this time, as I said, has only tried it twice. She will need to decide on her own as to the risks and benefits of taking sleep aids during pregnancy and considering behavioral management with CBT-I, which is more effective over time, and could  be quite helpful one the baby is born as well.    2.  Circadian rhythm shift work disorder.  We did discuss that it would be ideal if she could work a single shift so that she could provide herself with her a consistent sleep window for sleep.  She will look to see what she can do after her move.     Thank you for allowing me to participate in this kind woman's care.       Time spent with patient 60 minutes, of which greater than 50% was spent in counseling, education and coordination of care.         ANTONIO MOHAMUD, CNP             D: 10/16/2018   T: 10/17/2018   MT: SACHA      Name:     PERICO VELASCO   MRN:      [id number]        Account:      [id number]   :      1985           Visit Date:   10/15/2018      Document: [id number]       cc: Isabela Zaragoza MD

## 2018-10-18 ASSESSMENT — PATIENT HEALTH QUESTIONNAIRE - PHQ9: SUM OF ALL RESPONSES TO PHQ QUESTIONS 1-9: 4

## 2018-10-28 ENCOUNTER — TELEPHONE (OUTPATIENT)
Dept: OBGYN | Facility: CLINIC | Age: 33
End: 2018-10-28

## 2018-10-28 NOTE — TELEPHONE ENCOUNTER
10/28/2018 - Lower back pain reminiscent of MAB last year but less severe.  Pretty consistent since yesterday - but not really that noticeable.  That painful that needing relief.  No abdominal cramping.  No bleeding.  Maybe bloated yesterday. No s/s of UTI or flank pain.   Doing some extra lifting since she's moving (actualy moving tomorrow)  Not sleeping - stopped her medications as recommended as they weren't helping.   Encouraged heat, stretches, bath/shower, tylenol prn and modifications of lifting for low back pain.  If worsening or s/s of UTI or fever/flu contact CNM.  Encouraged no screens for 1-2 hours before rest, lavender products, bath/shower, worry journal, guided imagery/meditation.  Discussed atarax but pt states it just makes her groggy/hung over feeling without help.  Encouraged pt to try and rest even in small naps if possible. Avoid driving or other duties if sleep deprived.  Encouraged pt to continue to contact this practice for questions/concerns until she is established with her new providers in [place].  All pt's questions discussed and answered. Pt verbalized understanding of and agreement to plan of care.  Ania ALVAREZ CNM

## 2018-11-29 ENCOUNTER — PRIOR ORIGINAL RECORDS (OUTPATIENT)
Dept: OTHER | Age: 33
End: 2018-11-29

## 2018-12-02 ENCOUNTER — HOSPITAL ENCOUNTER (EMERGENCY)
Facility: HOSPITAL | Age: 33
Discharge: HOME | End: 2018-12-03
Attending: EMERGENCY MEDICINE
Payer: COMMERCIAL

## 2018-12-02 ENCOUNTER — APPOINTMENT (OUTPATIENT)
Dept: ULTRASOUND IMAGING | Facility: HOSPITAL | Age: 33
End: 2018-12-02
Attending: EMERGENCY MEDICINE
Payer: COMMERCIAL

## 2018-12-02 DIAGNOSIS — E87.6 HYPOKALEMIA: ICD-10-CM

## 2018-12-02 DIAGNOSIS — O44.40 LOW-LYING PLACENTA: ICD-10-CM

## 2018-12-02 DIAGNOSIS — O26.899 PREGNANCY WITH ABDOMINAL PAIN OF LOWER QUADRANT, ANTEPARTUM: Primary | ICD-10-CM

## 2018-12-02 DIAGNOSIS — R10.30 PREGNANCY WITH ABDOMINAL PAIN OF LOWER QUADRANT, ANTEPARTUM: Primary | ICD-10-CM

## 2018-12-02 PROCEDURE — 81003 URINALYSIS AUTO W/O SCOPE: CPT | Performed by: EMERGENCY MEDICINE

## 2018-12-02 PROCEDURE — 99284 EMERGENCY DEPT VISIT MOD MDM: CPT

## 2018-12-02 PROCEDURE — 96360 HYDRATION IV INFUSION INIT: CPT

## 2018-12-02 PROCEDURE — 84702 CHORIONIC GONADOTROPIN TEST: CPT | Performed by: EMERGENCY MEDICINE

## 2018-12-02 PROCEDURE — 86900 BLOOD TYPING SEROLOGIC ABO: CPT | Performed by: EMERGENCY MEDICINE

## 2018-12-02 PROCEDURE — 80053 COMPREHEN METABOLIC PANEL: CPT | Performed by: EMERGENCY MEDICINE

## 2018-12-02 PROCEDURE — 81003 URINALYSIS AUTO W/O SCOPE: CPT

## 2018-12-02 PROCEDURE — 86901 BLOOD TYPING SEROLOGIC RH(D): CPT | Performed by: EMERGENCY MEDICINE

## 2018-12-02 PROCEDURE — 85025 COMPLETE CBC W/AUTO DIFF WBC: CPT | Performed by: EMERGENCY MEDICINE

## 2018-12-03 ENCOUNTER — APPOINTMENT (OUTPATIENT)
Dept: ULTRASOUND IMAGING | Facility: HOSPITAL | Age: 33
End: 2018-12-03
Attending: EMERGENCY MEDICINE
Payer: COMMERCIAL

## 2018-12-03 VITALS
RESPIRATION RATE: 16 BRPM | BODY MASS INDEX: 20.28 KG/M2 | DIASTOLIC BLOOD PRESSURE: 51 MMHG | HEIGHT: 57 IN | SYSTOLIC BLOOD PRESSURE: 107 MMHG | HEART RATE: 79 BPM | OXYGEN SATURATION: 98 % | WEIGHT: 94 LBS | TEMPERATURE: 99 F

## 2018-12-03 PROCEDURE — 76815 OB US LIMITED FETUS(S): CPT | Performed by: EMERGENCY MEDICINE

## 2018-12-03 RX ORDER — POTASSIUM CHLORIDE 20 MEQ/1
20 TABLET, EXTENDED RELEASE ORAL ONCE
Status: COMPLETED | OUTPATIENT
Start: 2018-12-03 | End: 2018-12-03

## 2018-12-03 NOTE — ED INITIAL ASSESSMENT (HPI)
Patient arrives with intermittent lower ABD & lower back cramping, accompanied by nausea, that began yesterday. Patient called OB and was directed to come to ER. Patient denies vaginal bleeding.

## 2018-12-03 NOTE — ED PROVIDER NOTES
Patient Seen in: [facility] Emergency Department    History   Patient presents with:  Pregnancy Issues (gynecologic)    Stated Complaint: 16 weeks pregnant, abd cramping, no vaginal bleeding    HPI    Patient is a 74-year-old  pregnant female wh (37.2 °C)   Temp src Temporal   SpO2 99 %   O2 Device None (Room air)       Current:BP 98/58   Pulse 85   Temp 98.9 °F (37.2 °C) (Temporal)   Resp 14   Ht 144.8 cm (4' 9\")   Wt 42.6 kg   LMP 08/07/2018   SpO2 99%   BMI 20.34 kg/m²         Physical Exam other components within normal limits   CBC W/ DIFFERENTIAL - Abnormal; Notable for the following components:    RBC 3.12 (*)     HGB 10.5 (*)     HCT 30.0 (*)     MCH 33.7 (*)     RDW-SD 47.3 (*)     All other components within normal limits   URINALYSIS quadrant, antepartum  (primary encounter diagnosis)  Hypokalemia  Low-lying placenta    Disposition:  Discharge  12/3/2018  1:05 am    Follow-up:  Crystal Brady MD  [address]  [place] [address] [facility]  [phone]    Call in 1 day          Med

## 2018-12-13 ENCOUNTER — HOSPITAL ENCOUNTER (OUTPATIENT)
Dept: CV DIAGNOSTICS | Facility: HOSPITAL | Age: 33
Discharge: HOME | End: 2018-12-13
Attending: INTERNAL MEDICINE

## 2018-12-13 ENCOUNTER — MYAURORA ACCOUNT LINK (OUTPATIENT)
Dept: OTHER | Age: 33
End: 2018-12-13

## 2018-12-13 DIAGNOSIS — I34.1 MVP (MITRAL VALVE PROLAPSE): ICD-10-CM

## 2018-12-13 DIAGNOSIS — Z34.90 PREGNANCY, UNSPECIFIED GESTATIONAL AGE: ICD-10-CM

## 2018-12-13 DIAGNOSIS — I47.1 SVT (SUPRAVENTRICULAR TACHYCARDIA) (HCC): ICD-10-CM

## 2018-12-17 ENCOUNTER — PRIOR ORIGINAL RECORDS (OUTPATIENT)
Dept: OTHER | Age: 33
End: 2018-12-17

## 2019-01-07 ENCOUNTER — OFFICE VISIT (OUTPATIENT)
Dept: PERINATAL CARE | Facility: HOSPITAL | Age: 34
End: 2019-01-07
Attending: OBSTETRICS & GYNECOLOGY
Payer: COMMERCIAL

## 2019-01-07 VITALS
HEART RATE: 73 BPM | BODY MASS INDEX: 23.14 KG/M2 | SYSTOLIC BLOOD PRESSURE: 103 MMHG | HEIGHT: 55 IN | DIASTOLIC BLOOD PRESSURE: 67 MMHG | WEIGHT: 100 LBS

## 2019-01-07 DIAGNOSIS — I34.0 NON-RHEUMATIC MITRAL REGURGITATION: ICD-10-CM

## 2019-01-07 DIAGNOSIS — Z82.79 FAMILY HISTORY OF CONGENITAL HEART DEFECT: ICD-10-CM

## 2019-01-07 PROCEDURE — 99243 OFF/OP CNSLTJ NEW/EST LOW 30: CPT | Performed by: OBSTETRICS & GYNECOLOGY

## 2019-01-07 PROCEDURE — 76811 OB US DETAILED SNGL FETUS: CPT | Performed by: OBSTETRICS & GYNECOLOGY

## 2019-01-07 NOTE — PROGRESS NOTES
Indication: Fam hx brother w/ luca congenital heart defect, pt y/ lymphocytic colitis, hx MVP. Maternal age (28 years). ____________________________________________________________________________  History: Age: 28 years.  Maternal age at [facility]: 27 ye Aortic arch: Normal.  Genitalia: Female fetus.     ____________________________________________________________________________  Maternal Structures:  Cervical length 36.0 mm.  ____________________________________________________________________________ pregnancies, based on three observational studies of LEEP or laser conization. We discussed her history of LEEP and potential impact on her risk for  delivery. Surgical history is not as predictive as cervical length for  delivery. treatment of congenital heart disease have made it possible for more affected children to reach adulthood and attempt pregnancy. The cardiac output rises 30 to 50 percent above baseline during normal pregnancy.  The degree of change is acutely influ vena caval obstruction. Significant hemodynamic change can occur with  delivery because of the type of anesthesia required and a greater obligatory blood loss than that which occurs with vaginal delivery.  Vaginal delivery is generally preferred, if Pregnant women with acquired cardiac disease who are considered functionally normal should be allowed to go into labor spontaneously.  However, if there are any concerns about the functional adequacy of the heart and circulation, labor should be induced u

## 2019-01-08 ENCOUNTER — PRIOR ORIGINAL RECORDS (OUTPATIENT)
Dept: OTHER | Age: 34
End: 2019-01-08

## 2019-01-23 ENCOUNTER — PRIOR ORIGINAL RECORDS (OUTPATIENT)
Dept: OTHER | Age: 34
End: 2019-01-23

## 2019-01-28 ENCOUNTER — MYAURORA ACCOUNT LINK (OUTPATIENT)
Dept: OTHER | Age: 34
End: 2019-01-28

## 2019-01-28 ENCOUNTER — PRIOR ORIGINAL RECORDS (OUTPATIENT)
Dept: OTHER | Age: 34
End: 2019-01-28

## 2019-01-29 LAB
ALBUMIN: 3.5 G/DL
ALKALINE PHOSPHATATE(ALK PHOS): 43 IU/L
ALT (SGPT): 13 U/L
AST (SGOT): 17 U/L
BILIRUBIN TOTAL: 0.3 MG/DL
BUN: 9 MG/DL
CHLORIDE: 102 MEQ/L
CHOLESTEROL, TOTAL: 277 MG/DL
CHOLESTEROL, TOTAL: 277 MG/DL
CREATININE, SERUM: 0.55 MG/DL
GLOBULIN: 2.6 G/DL
GLUCOSE: 70 MG/DL
GLUCOSE: 70 MG/DL
HDL CHOLESTEROL: 109 MG/DL
HDL CHOLESTEROL: 109 MG/DL
HEMATOCRIT: 28.8 %
HEMOGLOBIN: 9.8 G/DL
LDL CHOLESTEROL: 143 MG/DL
LDL CHOLESTEROL: 143 MG/DL
MAGNESIUM: 1.8 MG/DL
NON-HDL CHOLESTEROL: 168 MG/DL
NON-HDL CHOLESTEROL: 168 MG/DL
PLATELETS: 299 K/UL
POTASSIUM, SERUM: 3.9 MEQ/L
PROTEIN, TOTAL: 6.1 G/DL
RED BLOOD COUNT: 3.01 X 10-6/U
SGOT (AST): 17 IU/L
SGPT (ALT): 13 IU/L
SODIUM: 135 MEQ/L
T4(THYROXINE): 9.3 MG/DL
THYROID STIMULATING HORMONE: 1.25 MLU/L
TOTAL CHOLESTEROL / HDL RATIO: 2.5 RATIO UN
TRIGLYCERIDES: 125 MG/DL
TRIGLYCERIDES: 125 MG/DL
WHITE BLOOD COUNT: 6.7 X 10-3/U

## 2019-02-05 ENCOUNTER — OFFICE VISIT (OUTPATIENT)
Dept: PERINATAL CARE | Facility: HOSPITAL | Age: 34
End: 2019-02-05
Attending: OBSTETRICS & GYNECOLOGY
Payer: COMMERCIAL

## 2019-02-05 VITALS
HEART RATE: 79 BPM | SYSTOLIC BLOOD PRESSURE: 102 MMHG | DIASTOLIC BLOOD PRESSURE: 63 MMHG | BODY MASS INDEX: 24 KG/M2 | WEIGHT: 103 LBS

## 2019-02-05 DIAGNOSIS — Z82.79 FAMILY HISTORY OF CONGENITAL HEART DEFECT: ICD-10-CM

## 2019-02-05 PROCEDURE — 99213 OFFICE O/P EST LOW 20 MIN: CPT | Performed by: OBSTETRICS & GYNECOLOGY

## 2019-02-05 PROCEDURE — 93325 DOPPLER ECHO COLOR FLOW MAPG: CPT | Performed by: OBSTETRICS & GYNECOLOGY

## 2019-02-05 PROCEDURE — 76827 ECHO EXAM OF FETAL HEART: CPT | Performed by: OBSTETRICS & GYNECOLOGY

## 2019-02-05 PROCEDURE — 76825 ECHO EXAM OF FETAL HEART: CPT | Performed by: OBSTETRICS & GYNECOLOGY

## 2019-02-05 NOTE — PROGRESS NOTES
Indication: fob brother with chd.   Maternal age (28 years). ____________________________________________________________________________  History: Age: 28 years. Maternal age at [facility]: 27 years. : 1 Para: 0.  Last menstrual period: 2018.  __ pregnant women; it still predominates in developing countries and in immigrant populations in the [place].  Congenital heart disease is now the most common form of heart disease complicating pregnancy in the [place], in part because advances in is associated with significant peripheral vasodilatation and a fall in blood pressure.      In spite of the obligatory blood loss that occurs with vaginal delivery, cardiac output increases after delivery because of autotransfusion from the uterus and relie atrial thrombi may develop with the associated need for anticoagulation and its attendant risks during pregnancy.       Virtually all gravidas with cardiac disease can expect to attempt vaginal delivery because it poses less cardiac risk than  deliv regurgitation: trivial to mild  5.  h/o LEEP    Recommendations:    1.  continue routine care       Thank you for allowing me to participate in the care of your patient. Please do not hesitate to call with any questions or concerns.     Total patient time

## 2019-02-28 VITALS
HEART RATE: 73 BPM | WEIGHT: 94 LBS | DIASTOLIC BLOOD PRESSURE: 60 MMHG | BODY MASS INDEX: 20.28 KG/M2 | SYSTOLIC BLOOD PRESSURE: 108 MMHG | HEIGHT: 57 IN

## 2019-02-28 VITALS
SYSTOLIC BLOOD PRESSURE: 98 MMHG | HEART RATE: 68 BPM | DIASTOLIC BLOOD PRESSURE: 52 MMHG | WEIGHT: 103 LBS | BODY MASS INDEX: 22.22 KG/M2 | HEIGHT: 57 IN

## 2019-03-21 PROBLEM — E04.1 THYROID NODULE: Status: ACTIVE | Noted: 2019-03-21

## 2019-03-21 PROBLEM — G47.09 OTHER INSOMNIA: Status: ACTIVE | Noted: 2019-03-21

## 2019-04-01 ENCOUNTER — OFFICE VISIT (OUTPATIENT)
Dept: HEMATOLOGY/ONCOLOGY | Facility: HOSPITAL | Age: 34
End: 2019-04-01
Attending: INTERNAL MEDICINE
Payer: COMMERCIAL

## 2019-04-01 VITALS
HEIGHT: 57 IN | RESPIRATION RATE: 16 BRPM | WEIGHT: 111.5 LBS | BODY MASS INDEX: 24.06 KG/M2 | HEART RATE: 83 BPM | SYSTOLIC BLOOD PRESSURE: 112 MMHG | TEMPERATURE: 98 F | OXYGEN SATURATION: 100 % | DIASTOLIC BLOOD PRESSURE: 57 MMHG

## 2019-04-01 DIAGNOSIS — O99.019 ANEMIA DURING PREGNANCY: ICD-10-CM

## 2019-04-01 DIAGNOSIS — T45.4X5A ADVERSE EFFECT OF IRON, INITIAL ENCOUNTER: ICD-10-CM

## 2019-04-01 DIAGNOSIS — D50.0 IRON DEFICIENCY ANEMIA DUE TO CHRONIC BLOOD LOSS: Primary | ICD-10-CM

## 2019-04-01 PROCEDURE — 99244 OFF/OP CNSLTJ NEW/EST MOD 40: CPT | Performed by: INTERNAL MEDICINE

## 2019-04-01 NOTE — CONSULTS
[facility] Report of Consultation    Patient Name: Abigail Hardy   YOB: 1985   Medical Record Number: [id number]   CSN: [id number]   Consulting Physician: Isak Khoury MD  Referring Physician(s): No ref.  provider found  Date of Consulta Grandmother        Gyne History:  Obstetric History     T0    L0    SAB0  TAB0  Ectopic0  Multiple0  Live Births0       Psychosocial History:  Social History    Socioeconomic History      Marital status:       Spouse name: Not on file Seat Belt: Not Asked        Self-Exams: Not Asked    Social History Narrative      Not on file      Allergies:   No Known Allergies    Current Medications:    Current Outpatient Medications:   •  traZODone HCl 50 MG Oral Tab, TK 1 TO 2 TS PO Q NIGHT, Disp 12/02/2018    MCV 96.2 12/02/2018    MCH 33.7 (H) 12/02/2018    MCHC 35.0 12/02/2018    RDW 13.3 12/02/2018    .0 12/02/2018     Lab Results   Component Value Date     12/02/2018    K 3.4 (L) 12/02/2018     12/02/2018    CO2 24.0 12/02/2

## 2019-04-08 RX ORDER — PRENATAL VIT/IRON FUM/FOLIC AC 27MG-0.8MG
TABLET ORAL
COMMUNITY

## 2019-04-12 ENCOUNTER — OFFICE VISIT (OUTPATIENT)
Dept: HEMATOLOGY/ONCOLOGY | Facility: HOSPITAL | Age: 34
End: 2019-04-12
Attending: INTERNAL MEDICINE
Payer: COMMERCIAL

## 2019-04-12 VITALS
DIASTOLIC BLOOD PRESSURE: 67 MMHG | OXYGEN SATURATION: 97 % | HEART RATE: 69 BPM | TEMPERATURE: 99 F | SYSTOLIC BLOOD PRESSURE: 110 MMHG | RESPIRATION RATE: 16 BRPM

## 2019-04-12 DIAGNOSIS — T45.4X5A ADVERSE EFFECT OF IRON, INITIAL ENCOUNTER: ICD-10-CM

## 2019-04-12 DIAGNOSIS — O99.019 ANEMIA DURING PREGNANCY: ICD-10-CM

## 2019-04-12 DIAGNOSIS — D50.0 IRON DEFICIENCY ANEMIA DUE TO CHRONIC BLOOD LOSS: Primary | ICD-10-CM

## 2019-04-12 PROCEDURE — 96376 TX/PRO/DX INJ SAME DRUG ADON: CPT

## 2019-04-12 PROCEDURE — 96365 THER/PROPH/DIAG IV INF INIT: CPT

## 2019-04-12 NOTE — PROGRESS NOTES
Education Record    Learner:  Patient and Spouse    Disease / Diagnosis: First time infed infusion - patient tolerated well without issues. Potential side effects gone over with patient and ; verbalized understanding and are agreeable.      Barriers

## 2019-05-15 ENCOUNTER — TELEPHONE (OUTPATIENT)
Dept: OBGYN UNIT | Facility: HOSPITAL | Age: 34
End: 2019-05-15

## 2019-05-15 ENCOUNTER — TELEPHONE (OUTPATIENT)
Dept: HEMATOLOGY/ONCOLOGY | Facility: HOSPITAL | Age: 34
End: 2019-05-15

## 2019-05-19 ENCOUNTER — HOSPITAL ENCOUNTER (INPATIENT)
Facility: HOSPITAL | Age: 34
LOS: 5 days | Discharge: HOME | End: 2019-05-24
Attending: OBSTETRICS & GYNECOLOGY | Admitting: OBSTETRICS & GYNECOLOGY
Payer: COMMERCIAL

## 2019-05-19 ENCOUNTER — APPOINTMENT (OUTPATIENT)
Dept: OBGYN CLINIC | Facility: HOSPITAL | Age: 34
End: 2019-05-19
Payer: COMMERCIAL

## 2019-05-19 DIAGNOSIS — D50.9 IRON DEFICIENCY ANEMIA DURING PREGNANCY: ICD-10-CM

## 2019-05-19 DIAGNOSIS — G47.09 OTHER INSOMNIA: ICD-10-CM

## 2019-05-19 DIAGNOSIS — E04.1 THYROID NODULE: ICD-10-CM

## 2019-05-19 DIAGNOSIS — I34.0 NON-RHEUMATIC MITRAL REGURGITATION: ICD-10-CM

## 2019-05-19 DIAGNOSIS — T45.4X5A ADVERSE EFFECT OF IRON, INITIAL ENCOUNTER: ICD-10-CM

## 2019-05-19 DIAGNOSIS — Z82.79 FAMILY HISTORY OF CONGENITAL HEART DEFECT: ICD-10-CM

## 2019-05-19 DIAGNOSIS — O99.019 IRON DEFICIENCY ANEMIA DURING PREGNANCY: ICD-10-CM

## 2019-05-19 DIAGNOSIS — D50.0 IRON DEFICIENCY ANEMIA DUE TO CHRONIC BLOOD LOSS: ICD-10-CM

## 2019-05-19 DIAGNOSIS — O99.019 ANEMIA DURING PREGNANCY: ICD-10-CM

## 2019-05-19 DIAGNOSIS — D62 ACUTE BLOOD LOSS ANEMIA: Primary | ICD-10-CM

## 2019-05-19 PROBLEM — Z34.90 PREGNANCY: Status: ACTIVE | Noted: 2019-05-19

## 2019-05-19 PROCEDURE — 3E0P7VZ INTRODUCTION OF HORMONE INTO FEMALE REPRODUCTIVE, VIA NATURAL OR ARTIFICIAL OPENING: ICD-10-PCS | Performed by: OBSTETRICS & GYNECOLOGY

## 2019-05-19 RX ORDER — DEXTROSE, SODIUM CHLORIDE, SODIUM LACTATE, POTASSIUM CHLORIDE, AND CALCIUM CHLORIDE 5; .6; .31; .03; .02 G/100ML; G/100ML; G/100ML; G/100ML; G/100ML
INJECTION, SOLUTION INTRAVENOUS AS NEEDED
Status: DISCONTINUED | OUTPATIENT
Start: 2019-05-19 | End: 2019-05-21 | Stop reason: HOSPADM

## 2019-05-19 RX ORDER — IBUPROFEN 600 MG/1
600 TABLET ORAL ONCE AS NEEDED
Status: DISCONTINUED | OUTPATIENT
Start: 2019-05-19 | End: 2019-05-20

## 2019-05-19 RX ORDER — ZOLPIDEM TARTRATE 5 MG/1
5 TABLET ORAL NIGHTLY PRN
Status: DISCONTINUED | OUTPATIENT
Start: 2019-05-19 | End: 2019-05-21 | Stop reason: HOSPADM

## 2019-05-19 RX ORDER — TERBUTALINE SULFATE 1 MG/ML
0.25 INJECTION, SOLUTION SUBCUTANEOUS AS NEEDED
Status: DISCONTINUED | OUTPATIENT
Start: 2019-05-19 | End: 2019-05-21 | Stop reason: HOSPADM

## 2019-05-19 RX ORDER — SODIUM CHLORIDE, SODIUM LACTATE, POTASSIUM CHLORIDE, CALCIUM CHLORIDE 600; 310; 30; 20 MG/100ML; MG/100ML; MG/100ML; MG/100ML
INJECTION, SOLUTION INTRAVENOUS CONTINUOUS
Status: DISCONTINUED | OUTPATIENT
Start: 2019-05-19 | End: 2019-05-21 | Stop reason: HOSPADM

## 2019-05-19 RX ORDER — TRISODIUM CITRATE DIHYDRATE AND CITRIC ACID MONOHYDRATE 500; 334 MG/5ML; MG/5ML
30 SOLUTION ORAL AS NEEDED
Status: DISCONTINUED | OUTPATIENT
Start: 2019-05-19 | End: 2019-05-21 | Stop reason: HOSPADM

## 2019-05-19 NOTE — PROGRESS NOTES
Pt is a 27year old female admitted to 105/105-A, Patient presents with:  Scheduled Induction     Pt is 40w5d intra-uterine pregnancy. Denies any leaking of fluid. Reports +fetal movement. History obtained, consents signed.  Oriented to room, staff, and ursula

## 2019-05-20 ENCOUNTER — ANESTHESIA EVENT (OUTPATIENT)
Dept: OBGYN UNIT | Facility: HOSPITAL | Age: 34
End: 2019-05-20
Payer: COMMERCIAL

## 2019-05-20 ENCOUNTER — ANESTHESIA (OUTPATIENT)
Dept: OBGYN UNIT | Facility: HOSPITAL | Age: 34
End: 2019-05-20
Payer: COMMERCIAL

## 2019-05-20 RX ORDER — FAMOTIDINE 20 MG/1
20 TABLET ORAL 2 TIMES DAILY
Status: DISCONTINUED | OUTPATIENT
Start: 2019-05-20 | End: 2019-05-21 | Stop reason: HOSPADM

## 2019-05-20 RX ORDER — MORPHINE SULFATE 0.5 MG/ML
0.5 INJECTION, SOLUTION EPIDURAL; INTRATHECAL; INTRAVENOUS ONCE
Status: DISCONTINUED | OUTPATIENT
Start: 2019-05-21 | End: 2019-05-21 | Stop reason: HOSPADM

## 2019-05-20 RX ORDER — DIPHENHYDRAMINE HYDROCHLORIDE 50 MG/ML
12.5 INJECTION INTRAMUSCULAR; INTRAVENOUS EVERY 4 HOURS PRN
Status: DISCONTINUED | OUTPATIENT
Start: 2019-05-20 | End: 2019-05-24

## 2019-05-20 RX ORDER — DIPHENHYDRAMINE HCL 25 MG
25 CAPSULE ORAL EVERY 4 HOURS PRN
Status: DISCONTINUED | OUTPATIENT
Start: 2019-05-20 | End: 2019-05-24

## 2019-05-20 RX ORDER — HYDROMORPHONE HYDROCHLORIDE 1 MG/ML
1 INJECTION, SOLUTION INTRAMUSCULAR; INTRAVENOUS; SUBCUTANEOUS ONCE
Status: COMPLETED | OUTPATIENT
Start: 2019-05-20 | End: 2019-05-20

## 2019-05-20 RX ORDER — HYDROMORPHONE HYDROCHLORIDE 1 MG/ML
INJECTION, SOLUTION INTRAMUSCULAR; INTRAVENOUS; SUBCUTANEOUS
Status: DISPENSED
Start: 2019-05-20 | End: 2019-05-21

## 2019-05-20 RX ORDER — NALBUPHINE HCL 10 MG/ML
2.5 AMPUL (ML) INJECTION EVERY 4 HOURS PRN
Status: DISCONTINUED | OUTPATIENT
Start: 2019-05-20 | End: 2019-05-24

## 2019-05-20 RX ORDER — NALOXONE HYDROCHLORIDE 0.4 MG/ML
0.08 INJECTION, SOLUTION INTRAMUSCULAR; INTRAVENOUS; SUBCUTANEOUS
Status: ACTIVE | OUTPATIENT
Start: 2019-05-20 | End: 2019-05-21

## 2019-05-20 RX ORDER — CEFAZOLIN SODIUM/WATER 2 G/20 ML
SYRINGE (ML) INTRAVENOUS
Status: DISPENSED
Start: 2019-05-20 | End: 2019-05-21

## 2019-05-20 RX ORDER — METHYLERGONOVINE MALEATE 0.2 MG/ML
INJECTION INTRAVENOUS
Status: DISPENSED
Start: 2019-05-20 | End: 2019-05-21

## 2019-05-20 RX ORDER — KETOROLAC TROMETHAMINE 30 MG/ML
30 INJECTION, SOLUTION INTRAMUSCULAR; INTRAVENOUS EVERY 6 HOURS PRN
Status: DISCONTINUED | OUTPATIENT
Start: 2019-05-20 | End: 2019-05-21

## 2019-05-20 RX ORDER — EPHEDRINE SULFATE/0.9% NACL/PF 25 MG/5 ML
5 SYRINGE (ML) INTRAVENOUS AS NEEDED
Status: DISCONTINUED | OUTPATIENT
Start: 2019-05-20 | End: 2019-05-21 | Stop reason: HOSPADM

## 2019-05-20 RX ORDER — NALBUPHINE HCL 10 MG/ML
2.5 AMPUL (ML) INJECTION
Status: DISCONTINUED | OUTPATIENT
Start: 2019-05-20 | End: 2019-05-21 | Stop reason: HOSPADM

## 2019-05-20 RX ORDER — ONDANSETRON 2 MG/ML
4 INJECTION INTRAMUSCULAR; INTRAVENOUS EVERY 6 HOURS PRN
Status: DISCONTINUED | OUTPATIENT
Start: 2019-05-20 | End: 2019-05-24

## 2019-05-20 RX ORDER — HYDROMORPHONE HYDROCHLORIDE 1 MG/ML
0.5 INJECTION, SOLUTION INTRAMUSCULAR; INTRAVENOUS; SUBCUTANEOUS EVERY 2 HOUR PRN
Status: ACTIVE | OUTPATIENT
Start: 2019-05-20 | End: 2019-05-21

## 2019-05-20 RX ORDER — DOCUSATE SODIUM 100 MG/1
100 CAPSULE, LIQUID FILLED ORAL 2 TIMES DAILY
Status: DISCONTINUED | OUTPATIENT
Start: 2019-05-20 | End: 2019-05-21 | Stop reason: HOSPADM

## 2019-05-20 NOTE — CM/SW NOTE
SW order placed. Pt is currently in L&D. Pt to complete the [facility] Post partum depression screening post delivery to determine further follow up needs.     Garrick Lopez MSW, LCSW   for Maternal/Child Services at [facility]  Ph: [phone]

## 2019-05-20 NOTE — H&P
[facility]    PATIENT'S NAME: Saranya Barroso   ATTENDING PHYSICIAN: Lord Ramon M.D.    PATIENT ACCOUNT#:   [de-identified]    LOCATION:  [address] [place] [address]  MEDICAL RECORD #:   [id number]       YOB: 1985  ADMISSION DATE:       05/19/2019 28 weeks negative. One-hour Glucola 108. PHYSICAL EXAMINATION:    GENERAL:  The patient is 4 feet 9 inches. She weighs approximately 115 pounds. VITAL SIGNS:  Stable. Afebrile. ABDOMEN:  Term uterus.   Estimated fetal weight approximately 6-1/2 poun

## 2019-05-20 NOTE — PLAN OF CARE
Problem: Patient/Family Goals  Goal: Patient/Family Long Term Goal  Description  Patient's Long Term Goal: adequate pain management    Interventions:    - See additional Care Plan goals for specific interventions   Outcome: Progressing  Goal: Patient/Fam

## 2019-05-21 PROCEDURE — 99253 IP/OBS CNSLTJ NEW/EST LOW 45: CPT | Performed by: NURSE PRACTITIONER

## 2019-05-21 RX ORDER — MELATONIN
325
Status: DISCONTINUED | OUTPATIENT
Start: 2019-05-21 | End: 2019-05-21

## 2019-05-21 RX ORDER — HYDROMORPHONE HYDROCHLORIDE 1 MG/ML
0.5 INJECTION, SOLUTION INTRAMUSCULAR; INTRAVENOUS; SUBCUTANEOUS EVERY 5 MIN PRN
Status: DISCONTINUED | OUTPATIENT
Start: 2019-05-21 | End: 2019-05-21 | Stop reason: HOSPADM

## 2019-05-21 RX ORDER — KETOROLAC TROMETHAMINE 30 MG/ML
30 INJECTION, SOLUTION INTRAMUSCULAR; INTRAVENOUS EVERY 6 HOURS PRN
Status: DISPENSED | OUTPATIENT
Start: 2019-05-21 | End: 2019-05-22

## 2019-05-21 RX ORDER — SODIUM CHLORIDE, SODIUM LACTATE, POTASSIUM CHLORIDE, CALCIUM CHLORIDE 600; 310; 30; 20 MG/100ML; MG/100ML; MG/100ML; MG/100ML
INJECTION, SOLUTION INTRAVENOUS CONTINUOUS
Status: DISCONTINUED | OUTPATIENT
Start: 2019-05-21 | End: 2019-05-24

## 2019-05-21 RX ORDER — DIPHENHYDRAMINE HYDROCHLORIDE 50 MG/ML
25 INJECTION INTRAMUSCULAR; INTRAVENOUS ONCE AS NEEDED
Status: DISCONTINUED | OUTPATIENT
Start: 2019-05-21 | End: 2019-05-21 | Stop reason: HOSPADM

## 2019-05-21 RX ORDER — HYDROCODONE BITARTRATE AND ACETAMINOPHEN 10; 325 MG/1; MG/1
1 TABLET ORAL EVERY 4 HOURS PRN
Status: DISCONTINUED | OUTPATIENT
Start: 2019-05-21 | End: 2019-05-24

## 2019-05-21 RX ORDER — DOCUSATE SODIUM 100 MG/1
100 CAPSULE, LIQUID FILLED ORAL
Status: DISCONTINUED | OUTPATIENT
Start: 2019-05-22 | End: 2019-05-22

## 2019-05-21 RX ORDER — ZOLPIDEM TARTRATE 5 MG/1
5 TABLET ORAL NIGHTLY PRN
Status: DISCONTINUED | OUTPATIENT
Start: 2019-05-21 | End: 2019-05-24

## 2019-05-21 RX ORDER — ACETAMINOPHEN 500 MG
1000 TABLET ORAL EVERY 6 HOURS PRN
Status: DISCONTINUED | OUTPATIENT
Start: 2019-05-21 | End: 2019-05-24

## 2019-05-21 RX ORDER — BISACODYL 10 MG
10 SUPPOSITORY, RECTAL RECTAL
Status: DISCONTINUED | OUTPATIENT
Start: 2019-05-21 | End: 2019-05-24

## 2019-05-21 RX ORDER — NALBUPHINE HCL 10 MG/ML
2.5 AMPUL (ML) INJECTION
Status: DISCONTINUED | OUTPATIENT
Start: 2019-05-21 | End: 2019-05-21 | Stop reason: HOSPADM

## 2019-05-21 RX ORDER — ONDANSETRON 2 MG/ML
4 INJECTION INTRAMUSCULAR; INTRAVENOUS ONCE AS NEEDED
Status: DISCONTINUED | OUTPATIENT
Start: 2019-05-21 | End: 2019-05-21 | Stop reason: HOSPADM

## 2019-05-21 RX ORDER — HYDROCODONE BITARTRATE AND ACETAMINOPHEN 5; 325 MG/1; MG/1
1 TABLET ORAL EVERY 4 HOURS PRN
Status: DISCONTINUED | OUTPATIENT
Start: 2019-05-21 | End: 2019-05-24

## 2019-05-21 RX ORDER — DEXTROSE, SODIUM CHLORIDE, SODIUM LACTATE, POTASSIUM CHLORIDE, AND CALCIUM CHLORIDE 5; .6; .31; .03; .02 G/100ML; G/100ML; G/100ML; G/100ML; G/100ML
INJECTION, SOLUTION INTRAVENOUS CONTINUOUS
Status: DISCONTINUED | OUTPATIENT
Start: 2019-05-21 | End: 2019-05-24

## 2019-05-21 RX ORDER — KETOROLAC TROMETHAMINE 30 MG/ML
30 INJECTION, SOLUTION INTRAMUSCULAR; INTRAVENOUS EVERY 6 HOURS PRN
Status: DISCONTINUED | OUTPATIENT
Start: 2019-05-21 | End: 2019-05-21 | Stop reason: HOSPADM

## 2019-05-21 RX ORDER — KETOROLAC TROMETHAMINE 30 MG/ML
30 INJECTION, SOLUTION INTRAMUSCULAR; INTRAVENOUS ONCE AS NEEDED
Status: DISCONTINUED | OUTPATIENT
Start: 2019-05-21 | End: 2019-05-21 | Stop reason: HOSPADM

## 2019-05-21 RX ORDER — SIMETHICONE 80 MG
80 TABLET,CHEWABLE ORAL 3 TIMES DAILY PRN
Status: DISCONTINUED | OUTPATIENT
Start: 2019-05-21 | End: 2019-05-24

## 2019-05-21 NOTE — PROGRESS NOTES
[facility]    Patients Name: Sara Montenegro  Attending Physician: Melany Kwon MD, MD  CSN: [id number]    Location:  1112/1112-A  MRN: [id number]    YOB: 1985  Admission Date: 5/19/2019     Obstetric Anesthesia Pain Progress Note    P

## 2019-05-21 NOTE — PLAN OF CARE
Problem: SAFETY ADULT - FALL  Goal: Free from fall injury  Description  INTERVENTIONS:  - Assess pt frequently for physical needs  - Identify cognitive and physical deficits and behaviors that affect risk of falls.   - [place] fall precautions as indica

## 2019-05-21 NOTE — PROGRESS NOTES
pericare done. Pt transferred per cart with baby in arms. Report given to [facility].  Proper ID done with two RNs

## 2019-05-21 NOTE — L&D DELIVERY NOTE
Gabby Huanon Girl Leydi Gamble [[id number]]    Labor Events     labor?:  No   steroids?:  None  Cervical ripening date/time:  2019  Cervical ripening type:  Cervidil  Rupture date/time:  2019 1730     Rupture type:  SROM  Fluid color: Removal  Appearance:  Intact  Disposition:  held for future pathology, Family     Apgars    Living status:  Living   Apgar Scoring Key:     0 1 2    Skin color Blue or pale Acrocyanotic Completely pink    Heart rate Absent <100 bpm >100 bpm    Reflex irrit

## 2019-05-21 NOTE — PLAN OF CARE
Problem: SAFETY ADULT - FALL  Goal: Free from fall injury  Description  INTERVENTIONS:  - Assess pt frequently for physical needs  - Identify cognitive and physical deficits and behaviors that affect risk of falls.   - [place] fall precautions as indica previous experience with breast feeding.  - Provide information as needed about early infant feeding cues (e.g., rooting, lip smacking, sucking fingers/hand) versus late cue of crying.  - Discuss/demonstrate breast feeding aids (e.g., infant sling, nursing as needed  - Evaluate fluid balance  Outcome: Progressing  Goal: Maintains or returns to baseline bowel function  Description  INTERVENTIONS:  - Assess bowel function  - Maintain adequate hydration with IV or PO as ordered and tolerated  - Evaluate effecti

## 2019-05-21 NOTE — ANESTHESIA PREPROCEDURE EVALUATION
PRE-OP EVALUATION    Patient Name: Angelito Holly    Pre-op Diagnosis: IUP 40.6, fetal intolerance to labor    Procedure(s):      Surgeon(s) and Role:     Carol Rondon MD - Primary     * Zafar Baxter MD - Assisting Surgeon    Pre-op vitals revie Disp:  Rfl: 0   Ferrous Sulfate (IRON) 325 (65 Fe) MG Oral Tab Take by mouth. Disp:  Rfl:    docusate sodium 100 MG Oral Cap Take 100 mg by mouth 2 (two) times daily.  Disp:  Rfl:    Psyllium (METAMUCIL OR)  Disp:  Rfl:    Prenatal Vit-Fe Sulfate-FA (PRENAT epidural  NPO status verified and patient meets guidelines. Post-procedure pain management plan discussed with surgeon and patient.   Surgeon requests: regional block  Comment: Pre-existing epidural catheter working well  Plan/risks discussed with: mikael

## 2019-05-21 NOTE — CONSULTS
Hem/Onc Report of Consultation    Patient Name: Taylor Jorge   YOB: 1985   Medical Record Number: [id number]   CSN: [id number]   Consulting Physician: Dr. Braeden Hogeu  Referring Provider(s): Dr. Aislinn Lopes  Date of Consultation: 5/21/2019     Reason OTHER      Spouse name: Not on file      Number of children: Not on file      Years of education: Not on file      Highest education level: Not on file    Occupational History      Not on file    Social Needs      Financial resource strain: Not on file dextrose 5 % /lactated ringers infusion  Intravenous Continuous   HYDROcodone-acetaminophen (NORCO) 5-325 MG per tab 1 tablet 1 tablet Oral Q4H PRN   Or      HYDROcodone-acetaminophen (NORCO)  MG per tab 1 tablet 1 tablet Oral Q4H PRN   Zolpidem Ta current facility-administered medications on file prior to encounter. Current Outpatient Medications on File Prior to Encounter:  traZODone HCl 50 MG Oral Tab TK 1 TO 2 TS PO Q NIGHT   Ferrous Sulfate (IRON) 325 (65 Fe) MG Oral Tab Take by mouth.    docus g/dL    HCT 25.7 (L) 35.0 - 48.0 %    .0 150.0 - 450.0 10(3)uL    MCV 98.1 80.0 - 100.0 fL    MCH 33.6 26.0 - 34.0 pg    MCHC 34.2 31.0 - 37.0 g/dL    RDW 13.2 11.0 - 15.0 %    RDW-SD 47.2 (H) 35.1 - 46.3 fL    Neutrophil Absolute Prelim 12.19 (H) 1

## 2019-05-21 NOTE — PROGRESS NOTES
[facility]  Antepartum    Sharyle Cabot Patient Status:  Inpatient    1985 MRN [id number]   Location [address] [facility] Attending Richar Moura MD, MD   Hosp Day # 1 PCP Madelyn Leon MD     SUBJECTIVE:    Interval History:

## 2019-05-21 NOTE — PLAN OF CARE
Problem: SAFETY ADULT - FALL  Goal: Free from fall injury  Description  INTERVENTIONS:  - Assess pt frequently for physical needs  - Identify cognitive and physical deficits and behaviors that affect risk of falls.   - [place] fall precautions as indica

## 2019-05-21 NOTE — OPERATIVE REPORT
[facility]    PATIENT'S NAME: Keri Mae   ATTENDING PHYSICIAN: Karina Beck M.D. OPERATING PHYSICIAN: Janina Meléndez M.D.    PATIENT ACCOUNT#:   [de-identified]    LOCATION:  [address] [place] [address]  MEDICAL RECORD #:   [id number]       DATE OF CHETAN started to have decelerations. The Pitocin was stopped. The patient progressed to 100%, 5 cm, -1 station.   Discussion with patient regarding recommendation of  versus continued attempted trial of labor, and patient desired to proceed with francie placed inside the uterus, and the infant's head was delivered into the operating field and then the infant's body. The infant was immediately suctioned, showed to the mom. Delayed cord clamping was performed for 45 seconds.   The cord was then clamped and

## 2019-05-21 NOTE — BRIEF OP NOTE
[facility]  Post-Op  Procedure Note    Freddysri Holly Patient Status:  Inpatient    1985 MRN [id number]   Location [address] [facility] Attending Noman Brooke MD, MD   Hosp Day # 2 PCP Yesenia Pierre MD     Preoperative

## 2019-05-21 NOTE — PLAN OF CARE
Problem: SAFETY ADULT - FALL  Goal: Free from fall injury  Description  INTERVENTIONS:  - Assess pt frequently for physical needs  - Identify cognitive and physical deficits and behaviors that affect risk of falls.   - [place] fall precautions as indica previous experience with breast feeding.  - Provide information as needed about early infant feeding cues (e.g., rooting, lip smacking, sucking fingers/hand) versus late cue of crying.  - Discuss/demonstrate breast feeding aids (e.g., infant sling, nursing as needed  - Evaluate fluid balance  Outcome: Completed  Goal: Maintains or returns to baseline bowel function  Description  INTERVENTIONS:  - Assess bowel function  - Maintain adequate hydration with IV or PO as ordered and tolerated  - Evaluate effective IV or PO as ordered and tolerated  - Evaluate effectiveness of GI medications  - Encourage mobilization and activity  - Obtain nutritional consult as needed  - Establish a toileting routine/schedule  - Consider collaborating with pharmacy to review patient

## 2019-05-21 NOTE — PROGRESS NOTES
Dr Dayron Cobos calls this RN for update on pt. Dr Dayron Cobos states she reviewed tracing and noted Late decels. This RN informs her pt just repositioned to R side and 5 mg of ephedrine given for decreased BP.  SVE 4/90/-1 at 1830 after epidural. Will continue to m

## 2019-05-21 NOTE — ANESTHESIA POSTPROCEDURE EVALUATION
Mindi 149 Patient Status:  Inpatient   Age/Gender 27year old female MRN [id number]   Location [address] [facility] Attending Shilpa Wright MD, MD   Hosp Day # 2 PCP Colette López MD       Anesthesia Post-op Note

## 2019-05-21 NOTE — PROGRESS NOTES
[facility]  Post-Partum Caesarean Section Progress Note    Lorrine Darby Patient Status:  Inpatient    1985 MRN [id number]   [facility] 1SW-J Attending Mable Padilla MD, MD   Hosp Day # 2 PCP Pascale Bain MD     SUBJECTIVE:

## 2019-05-21 NOTE — PROGRESS NOTES
I spoke with the RN for an update as the patient was having some late type decels but her bp was low and she was just given ephedrine and repositioned. She is 5/100/0 per the rn. The late decels resolved with ephedrine and position change.  She is afebrile

## 2019-05-22 PROBLEM — Z34.90 PREGNANCY: Status: RESOLVED | Noted: 2019-05-19 | Resolved: 2019-05-22

## 2019-05-22 PROCEDURE — 99231 SBSQ HOSP IP/OBS SF/LOW 25: CPT | Performed by: INTERNAL MEDICINE

## 2019-05-22 RX ORDER — DOCUSATE SODIUM 100 MG/1
100 CAPSULE, LIQUID FILLED ORAL 3 TIMES DAILY
Status: DISCONTINUED | OUTPATIENT
Start: 2019-05-22 | End: 2019-05-24

## 2019-05-22 RX ORDER — POLYETHYLENE GLYCOL 3350 17 G/17G
17 POWDER, FOR SOLUTION ORAL DAILY PRN
Status: DISCONTINUED | OUTPATIENT
Start: 2019-05-22 | End: 2019-05-24

## 2019-05-22 NOTE — PROGRESS NOTES
[facility]  Post-Partum Caesarean Section Progress Note    Artelia Salvage Patient Status:  Inpatient    1985 MRN [id number]   [facility] 1SW-J Attending Liset Roland MD, MD   Hosp Day # 3 PCP Chucky Molina MD     SUBJECTIVE:

## 2019-05-22 NOTE — BH PROGRESS NOTE
[facility] PPD SCREENING    Reason for Referral: This patient was referred for behavioral health assessment due to EPDS of 11, no thoughts of self-harm.   She was interviewed in the company of her  [employer],  per her request.    Current Stressors:    History o and mother. She is worried that she will not bond with her baby.    Appearance/General Behavior: No impairment   General Cognitive Functioning: No impairment   Thought Process: Linear   Thought Content: Appropriate to structured interview   Mood/Affect: Sta

## 2019-05-22 NOTE — PROGRESS NOTES
Heme/Onc Progress Note - [facility]      Chief Complaint:    Follow up for evaluation and management of iron deficiency anemia associated with pregnancy. Interim History:      The patient has post op abdominal pain. This stable. She had two doses of Venofer.

## 2019-05-23 ENCOUNTER — MED REC SCAN ONLY (OUTPATIENT)
Dept: HEMATOLOGY/ONCOLOGY | Facility: HOSPITAL | Age: 34
End: 2019-05-23

## 2019-05-23 NOTE — PROGRESS NOTES
POD#3    S: doing well, bleeding minimal, pain ok with norco, pumping  She is requesting metamucil and states that she fees like she may have a uti  O: /56 (BP Location: Left arm)   Pulse 68   Temp 98.2 °F (36.8 °C) (Oral)   Resp 16   Ht 4' 9.5\" (1.

## 2019-05-24 VITALS
DIASTOLIC BLOOD PRESSURE: 73 MMHG | WEIGHT: 114 LBS | TEMPERATURE: 99 F | RESPIRATION RATE: 17 BRPM | OXYGEN SATURATION: 100 % | HEIGHT: 57.5 IN | HEART RATE: 60 BPM | BODY MASS INDEX: 24.26 KG/M2 | SYSTOLIC BLOOD PRESSURE: 112 MMHG

## 2019-05-24 RX ORDER — HYDROCODONE BITARTRATE AND ACETAMINOPHEN 5; 325 MG/1; MG/1
1 TABLET ORAL EVERY 6 HOURS PRN
Qty: 20 TABLET | Refills: 0 | Status: SHIPPED | OUTPATIENT
Start: 2019-05-24

## 2019-05-24 NOTE — PROGRESS NOTES
Postop Day 3    Pt without complaints.  Pain under control with meds, + flatus, +BM, Minimal VB    Temp: 98.8 °F (37.1 °C)  Pulse: 60  Resp: 17  BP: 112/73  Abdomen -  Soft, NT, ND, fundus firm below umbilicus, incision C/D/I  Extremities - Trace edema, no

## 2019-05-24 NOTE — DISCHARGE SUMMARY
[facility]  Discharge Summary    Lalito Benjamin Patient Status:  Inpatient    1985 MRN [id number]   [facility] 1SW-J Attending Candelaria Luna MD, MD   [facility] Day # 5 PCP Isauro De León MD     Date of Admission: 2019    Date 5,000 Units by mouth. Once weekly     Omega-3 Fatty Acids (FISH OIL OR)  Take by mouth.       STOP taking these medications    traZODone HCl 50 MG Oral Tab    Ferrous Sulfate (IRON) 325 (65 Fe) MG Oral Tab            Lelo To  5/24/2019  12:0

## 2019-05-26 ENCOUNTER — TELEPHONE (OUTPATIENT)
Dept: LACTATION | Facility: HOSPITAL | Age: 34
End: 2019-05-26

## 2019-05-26 NOTE — TELEPHONE ENCOUNTER
Issues Identified: (actual or potential)  Engorgement    Education Provided  Self-care for engorgement    Follow Up  Call back as needed and Breastfeeding clinic appointment recommended    Mother's response  Mother verbalized understanding.

## 2019-05-28 ENCOUNTER — TELEPHONE (OUTPATIENT)
Dept: LACTATION | Facility: HOSPITAL | Age: 34
End: 2019-05-28

## 2019-05-28 NOTE — TELEPHONE ENCOUNTER
Issues Identified: (actual or potential)  Plugged duct    Education Provided  S/S and self-care for plugged duct.   Patietn to call OB physician if persisits >1-2 days    Follow Up  Breastfeeding Clinic appointment scheduled for 5/29/19    Mother's response

## 2019-05-29 ENCOUNTER — NURSE ONLY (OUTPATIENT)
Dept: LACTATION | Facility: HOSPITAL | Age: 34
End: 2019-05-29
Attending: OBSTETRICS & GYNECOLOGY
Payer: COMMERCIAL

## 2019-05-29 ENCOUNTER — TELEPHONE (OUTPATIENT)
Dept: OBGYN UNIT | Facility: HOSPITAL | Age: 34
End: 2019-05-29

## 2019-05-29 VITALS — TEMPERATURE: 99 F

## 2019-05-29 DIAGNOSIS — Z91.89 AT RISK FOR INEFFECTIVE BREASTFEEDING: ICD-10-CM

## 2019-05-29 DIAGNOSIS — O92.5 SUPPRESSED LACTATION, POSTPARTUM CONDITION OR COMPLICATION: Primary | ICD-10-CM

## 2019-05-29 DIAGNOSIS — O92.29 POSTPARTUM NIPPLE PAIN: ICD-10-CM

## 2019-05-29 PROCEDURE — 99213 OFFICE O/P EST LOW 20 MIN: CPT

## 2019-05-29 NOTE — PATIENT INSTRUCTIONS
Try breastfeeding with the nipple shield 4 times a day to start. Supplement after using the shield w/ ~1.5oz (45ml), more or less depending on volume fed from breast.  Supplement w/ a bottle at all other feedings, ~2-3oz each.   Watch for good output (6-8

## 2019-05-30 ENCOUNTER — TELEPHONE (OUTPATIENT)
Dept: OBGYN UNIT | Facility: HOSPITAL | Age: 34
End: 2019-05-30

## 2019-06-03 ENCOUNTER — NURSE ONLY (OUTPATIENT)
Dept: LACTATION | Facility: HOSPITAL | Age: 34
End: 2019-06-03
Attending: OBSTETRICS & GYNECOLOGY
Payer: COMMERCIAL

## 2019-06-03 DIAGNOSIS — O92.79 POOR LATCH ON, POSTPARTUM: Primary | ICD-10-CM

## 2019-06-03 DIAGNOSIS — Z91.89 AT RISK FOR INEFFECTIVE BREASTFEEDING: ICD-10-CM

## 2019-06-03 PROCEDURE — 99212 OFFICE O/P EST SF 10 MIN: CPT

## 2019-06-17 ENCOUNTER — TELEPHONE (OUTPATIENT)
Dept: LACTATION | Facility: HOSPITAL | Age: 34
End: 2019-06-17

## 2019-06-17 NOTE — TELEPHONE ENCOUNTER
Issues Identified: (actual or potential)  Engorgement, Plugged duct and pumping only with Medela Pump in Style. [de-identified] 3weeks old and mother pumping 900 ml day. Engorgement/plugged ducts has been on/off since but never resolving totally.   Painful breast t

## 2019-07-28 PROBLEM — R53.81 MALAISE AND FATIGUE: Status: ACTIVE | Noted: 2018-11-29

## 2019-07-28 PROBLEM — I34.1 MITRAL VALVE PROLAPSE: Status: ACTIVE | Noted: 2018-11-29

## 2019-07-28 PROBLEM — E78.01 ESSENTIAL FAMILIAL HYPERCHOLESTEROLEMIA: Status: ACTIVE | Noted: 2019-01-28

## 2019-07-28 PROBLEM — I47.10 SUPRAVENTRICULAR TACHYCARDIA: Status: ACTIVE | Noted: 2018-11-29

## 2019-07-28 PROBLEM — R53.83 MALAISE AND FATIGUE: Status: ACTIVE | Noted: 2018-11-29

## 2019-07-28 PROBLEM — R00.2 PALPITATIONS: Status: ACTIVE | Noted: 2018-11-29

## 2019-07-28 PROBLEM — R55 SYNCOPE: Status: ACTIVE | Noted: 2018-11-29

## 2019-12-26 ENCOUNTER — OFFICE VISIT (OUTPATIENT)
Dept: FAMILY MEDICINE CLINIC | Facility: CLINIC | Age: 34
End: 2019-12-26
Payer: COMMERCIAL

## 2019-12-26 VITALS
HEIGHT: 57 IN | WEIGHT: 88 LBS | RESPIRATION RATE: 16 BRPM | SYSTOLIC BLOOD PRESSURE: 112 MMHG | DIASTOLIC BLOOD PRESSURE: 63 MMHG | TEMPERATURE: 98 F | BODY MASS INDEX: 18.99 KG/M2 | HEART RATE: 78 BPM

## 2019-12-26 DIAGNOSIS — R50.9 FEVER AND CHILLS: ICD-10-CM

## 2019-12-26 DIAGNOSIS — J10.1 INFLUENZA B: Primary | ICD-10-CM

## 2019-12-26 PROCEDURE — 87502 INFLUENZA DNA AMP PROBE: CPT | Performed by: NURSE PRACTITIONER

## 2019-12-26 PROCEDURE — 99213 OFFICE O/P EST LOW 20 MIN: CPT | Performed by: NURSE PRACTITIONER

## 2019-12-28 NOTE — PROGRESS NOTES
Patient presents with:  Flu: symptoms began today  Sore Throat: began yesterday      SUBJECTIVE:   Georgiana Castillo is a 27year old female who presents complaining of flu-like symptoms including:  fever to 103, malaise, fatigue, chills, myalgias, congesti --Skin:  No problems with hair or nails. No rash. No new skin lesions.  --HEENT: See HPI  --Respiratory: Cough present.  No, wheezing, changes in voice or shortness of breath  --Cardiovascular:  No chest pain, palpitations or other cardiac complaints note 3. Printed literature on Influenza/URI was given to [employer] and highlights discussed. Discussed possibility of secondary bacterial infections and when to seek care back in clinic versus IC/ER in detail.       Patient verbalized understanding of th · Over-the-counter cold medicines will not make the flu go away faster. But the medicines may help with coughing, sore throat, and congestion in your nose and sinuses. Don’t use a decongestant if you have high blood pressure.   · Stay home until your fever

## 2020-03-11 ENCOUNTER — HEALTH MAINTENANCE LETTER (OUTPATIENT)
Age: 35
End: 2020-03-11

## 2021-01-03 ENCOUNTER — HEALTH MAINTENANCE LETTER (OUTPATIENT)
Age: 36
End: 2021-01-03

## 2021-04-25 ENCOUNTER — HEALTH MAINTENANCE LETTER (OUTPATIENT)
Age: 36
End: 2021-04-25

## 2021-10-10 ENCOUNTER — HEALTH MAINTENANCE LETTER (OUTPATIENT)
Age: 36
End: 2021-10-10

## 2022-02-17 PROBLEM — O09.91 SUPERVISION OF HIGH RISK PREGNANCY IN FIRST TRIMESTER: Status: ACTIVE | Noted: 2018-10-01

## 2022-05-21 ENCOUNTER — HEALTH MAINTENANCE LETTER (OUTPATIENT)
Age: 37
End: 2022-05-21

## 2022-09-18 ENCOUNTER — HEALTH MAINTENANCE LETTER (OUTPATIENT)
Age: 37
End: 2022-09-18

## 2023-06-04 ENCOUNTER — HEALTH MAINTENANCE LETTER (OUTPATIENT)
Age: 38
End: 2023-06-04

## 2024-10-26 ASSESSMENT — ANXIETY QUESTIONNAIRES: GAD7 TOTAL SCORE: 5

## (undated) DEVICE — Device

## (undated) NOTE — ED AVS SNAPSHOT
Georgiana Castillo   MRN: [id number]    Department:  [facility] Emergency Department   Date of Visit:  12/2/2018           Disclosure     Insurance plans vary and the physician(s) referred by the ER may not be covered by your plan.  Please contact yo tell this physician (or your personal doctor if your instructions are to return to your personal doctor) about any new or lasting problems. The primary care or specialist physician will see patients referred from the [facility] Emergency Department.  Venu Umana